# Patient Record
Sex: FEMALE | Race: WHITE | Employment: OTHER | URBAN - METROPOLITAN AREA
[De-identification: names, ages, dates, MRNs, and addresses within clinical notes are randomized per-mention and may not be internally consistent; named-entity substitution may affect disease eponyms.]

---

## 2020-10-13 ENCOUNTER — HOSPITAL ENCOUNTER (OUTPATIENT)
Age: 85
Setting detail: OBSERVATION
Discharge: HOME | End: 2020-10-14
Attending: EMERGENCY MEDICINE | Admitting: INTERNAL MEDICINE
Payer: MEDICARE

## 2020-10-13 ENCOUNTER — APPOINTMENT (OUTPATIENT)
Dept: CT IMAGING | Age: 85
End: 2020-10-13
Payer: MEDICARE

## 2020-10-13 PROBLEM — J35.8: Status: ACTIVE | Noted: 2020-10-13

## 2020-10-13 PROBLEM — J45.909 ASTHMA: Status: ACTIVE | Noted: 2020-10-13

## 2020-10-13 PROBLEM — J44.9 CHRONIC OBSTRUCTIVE PULMONARY DISEASE (HCC): Status: ACTIVE | Noted: 2020-07-15

## 2020-10-13 PROBLEM — I10 BENIGN ESSENTIAL HYPERTENSION: Status: ACTIVE | Noted: 2020-07-15

## 2020-10-13 PROBLEM — J98.8 AIRWAY COMPROMISE: Status: ACTIVE | Noted: 2020-10-13

## 2020-10-13 PROBLEM — E11.9 DIABETES MELLITUS (HCC): Status: ACTIVE | Noted: 2020-07-15

## 2020-10-13 LAB
ABSOLUTE EOS #: <0.03 K/UL (ref 0–0.44)
ABSOLUTE IMMATURE GRANULOCYTE: 0.05 K/UL (ref 0–0.3)
ABSOLUTE LYMPH #: 1.26 K/UL (ref 1.1–3.7)
ABSOLUTE MONO #: 0.33 K/UL (ref 0.1–1.2)
ANION GAP SERPL CALCULATED.3IONS-SCNC: 9 MMOL/L (ref 9–17)
BASOPHILS # BLD: 0 % (ref 0–2)
BASOPHILS ABSOLUTE: <0.03 K/UL (ref 0–0.2)
BUN BLDV-MCNC: 20 MG/DL (ref 8–23)
BUN/CREAT BLD: 18 (ref 9–20)
CALCIUM SERPL-MCNC: 9 MG/DL (ref 8.6–10.4)
CHLORIDE BLD-SCNC: 104 MMOL/L (ref 98–107)
CO2: 24 MMOL/L (ref 20–31)
CREAT SERPL-MCNC: 1.11 MG/DL (ref 0.5–0.9)
DIFFERENTIAL TYPE: ABNORMAL
EOSINOPHILS RELATIVE PERCENT: 0 % (ref 1–4)
ESTIMATED AVERAGE GLUCOSE: 123 MG/DL
GFR AFRICAN AMERICAN: 57 ML/MIN
GFR NON-AFRICAN AMERICAN: 47 ML/MIN
GFR SERPL CREATININE-BSD FRML MDRD: ABNORMAL ML/MIN/{1.73_M2}
GFR SERPL CREATININE-BSD FRML MDRD: ABNORMAL ML/MIN/{1.73_M2}
GLUCOSE BLD-MCNC: 126 MG/DL (ref 70–99)
GLUCOSE BLD-MCNC: 170 MG/DL (ref 65–105)
HBA1C MFR BLD: 5.9 % (ref 4–6)
HCT VFR BLD CALC: 35.3 % (ref 36.3–47.1)
HEMOGLOBIN: 11.1 G/DL (ref 11.9–15.1)
IMMATURE GRANULOCYTES: 1 %
LYMPHOCYTES # BLD: 11 % (ref 24–43)
MCH RBC QN AUTO: 30.2 PG (ref 25.2–33.5)
MCHC RBC AUTO-ENTMCNC: 31.4 G/DL (ref 28.4–34.8)
MCV RBC AUTO: 96.2 FL (ref 82.6–102.9)
MONOCYTES # BLD: 3 % (ref 3–12)
NRBC AUTOMATED: 0 PER 100 WBC
PDW BLD-RTO: 12.9 % (ref 11.8–14.4)
PLATELET # BLD: 189 K/UL (ref 138–453)
PLATELET ESTIMATE: ABNORMAL
PMV BLD AUTO: 10 FL (ref 8.1–13.5)
POTASSIUM SERPL-SCNC: 4.4 MMOL/L (ref 3.7–5.3)
RBC # BLD: 3.67 M/UL (ref 3.95–5.11)
RBC # BLD: ABNORMAL 10*6/UL
SEDIMENTATION RATE, ERYTHROCYTE: 37 MM (ref 0–30)
SEG NEUTROPHILS: 85 % (ref 36–65)
SEGMENTED NEUTROPHILS ABSOLUTE COUNT: 9.4 K/UL (ref 1.5–8.1)
SODIUM BLD-SCNC: 137 MMOL/L (ref 135–144)
WBC # BLD: 11.1 K/UL (ref 3.5–11.3)
WBC # BLD: ABNORMAL 10*3/UL

## 2020-10-13 PROCEDURE — 85025 COMPLETE CBC W/AUTO DIFF WBC: CPT

## 2020-10-13 PROCEDURE — 99283 EMERGENCY DEPT VISIT LOW MDM: CPT

## 2020-10-13 PROCEDURE — 6360000002 HC RX W HCPCS: Performed by: NURSE PRACTITIONER

## 2020-10-13 PROCEDURE — 80048 BASIC METABOLIC PNL TOTAL CA: CPT

## 2020-10-13 PROCEDURE — 6370000000 HC RX 637 (ALT 250 FOR IP): Performed by: NURSE PRACTITIONER

## 2020-10-13 PROCEDURE — 2580000003 HC RX 258: Performed by: NURSE PRACTITIONER

## 2020-10-13 PROCEDURE — 83036 HEMOGLOBIN GLYCOSYLATED A1C: CPT

## 2020-10-13 PROCEDURE — 70487 CT MAXILLOFACIAL W/DYE: CPT

## 2020-10-13 PROCEDURE — 94640 AIRWAY INHALATION TREATMENT: CPT

## 2020-10-13 PROCEDURE — 2580000003 HC RX 258: Performed by: EMERGENCY MEDICINE

## 2020-10-13 PROCEDURE — 6360000004 HC RX CONTRAST MEDICATION: Performed by: EMERGENCY MEDICINE

## 2020-10-13 PROCEDURE — 96366 THER/PROPH/DIAG IV INF ADDON: CPT

## 2020-10-13 PROCEDURE — G0378 HOSPITAL OBSERVATION PER HR: HCPCS

## 2020-10-13 PROCEDURE — 82947 ASSAY GLUCOSE BLOOD QUANT: CPT

## 2020-10-13 PROCEDURE — 94761 N-INVAS EAR/PLS OXIMETRY MLT: CPT

## 2020-10-13 PROCEDURE — 96365 THER/PROPH/DIAG IV INF INIT: CPT

## 2020-10-13 PROCEDURE — 96375 TX/PRO/DX INJ NEW DRUG ADDON: CPT

## 2020-10-13 PROCEDURE — 99219 PR INITIAL OBSERVATION CARE/DAY 50 MINUTES: CPT | Performed by: NURSE PRACTITIONER

## 2020-10-13 PROCEDURE — 96376 TX/PRO/DX INJ SAME DRUG ADON: CPT

## 2020-10-13 PROCEDURE — 85652 RBC SED RATE AUTOMATED: CPT

## 2020-10-13 PROCEDURE — 99285 EMERGENCY DEPT VISIT HI MDM: CPT

## 2020-10-13 RX ORDER — SODIUM CHLORIDE 0.9 % (FLUSH) 0.9 %
10 SYRINGE (ML) INJECTION EVERY 12 HOURS SCHEDULED
Status: DISCONTINUED | OUTPATIENT
Start: 2020-10-13 | End: 2020-10-14 | Stop reason: HOSPADM

## 2020-10-13 RX ORDER — LISINOPRIL 10 MG/1
10 TABLET ORAL DAILY
Status: DISCONTINUED | OUTPATIENT
Start: 2020-10-13 | End: 2020-10-13 | Stop reason: DRUGHIGH

## 2020-10-13 RX ORDER — DEXTROSE MONOHYDRATE 25 G/50ML
12.5 INJECTION, SOLUTION INTRAVENOUS PRN
Status: DISCONTINUED | OUTPATIENT
Start: 2020-10-13 | End: 2020-10-14 | Stop reason: HOSPADM

## 2020-10-13 RX ORDER — SODIUM CHLORIDE 0.9 % (FLUSH) 0.9 %
10 SYRINGE (ML) INJECTION PRN
Status: DISCONTINUED | OUTPATIENT
Start: 2020-10-13 | End: 2020-10-14 | Stop reason: HOSPADM

## 2020-10-13 RX ORDER — ACETAMINOPHEN 325 MG/1
650 TABLET ORAL EVERY 4 HOURS PRN
Status: DISCONTINUED | OUTPATIENT
Start: 2020-10-13 | End: 2020-10-14 | Stop reason: HOSPADM

## 2020-10-13 RX ORDER — DEXTROSE MONOHYDRATE 50 MG/ML
100 INJECTION, SOLUTION INTRAVENOUS PRN
Status: DISCONTINUED | OUTPATIENT
Start: 2020-10-13 | End: 2020-10-14 | Stop reason: HOSPADM

## 2020-10-13 RX ORDER — LISINOPRIL 5 MG/1
5 TABLET ORAL DAILY
Status: DISCONTINUED | OUTPATIENT
Start: 2020-10-14 | End: 2020-10-14 | Stop reason: HOSPADM

## 2020-10-13 RX ORDER — MORPHINE SULFATE 2 MG/ML
2 INJECTION, SOLUTION INTRAMUSCULAR; INTRAVENOUS EVERY 4 HOURS PRN
Status: DISCONTINUED | OUTPATIENT
Start: 2020-10-13 | End: 2020-10-14 | Stop reason: HOSPADM

## 2020-10-13 RX ORDER — BUDESONIDE AND FORMOTEROL FUMARATE DIHYDRATE 160; 4.5 UG/1; UG/1
2 AEROSOL RESPIRATORY (INHALATION) 2 TIMES DAILY
Status: DISCONTINUED | OUTPATIENT
Start: 2020-10-13 | End: 2020-10-13

## 2020-10-13 RX ORDER — DEXAMETHASONE SODIUM PHOSPHATE 10 MG/ML
10 INJECTION, SOLUTION INTRAMUSCULAR; INTRAVENOUS ONCE
Status: COMPLETED | OUTPATIENT
Start: 2020-10-13 | End: 2020-10-13

## 2020-10-13 RX ORDER — NICOTINE POLACRILEX 4 MG
15 LOZENGE BUCCAL PRN
Status: DISCONTINUED | OUTPATIENT
Start: 2020-10-13 | End: 2020-10-14 | Stop reason: HOSPADM

## 2020-10-13 RX ORDER — BUDESONIDE AND FORMOTEROL FUMARATE DIHYDRATE 160; 4.5 UG/1; UG/1
2 AEROSOL RESPIRATORY (INHALATION) 2 TIMES DAILY
Status: DISCONTINUED | OUTPATIENT
Start: 2020-10-14 | End: 2020-10-14 | Stop reason: HOSPADM

## 2020-10-13 RX ORDER — LISINOPRIL 5 MG/1
5 TABLET ORAL DAILY
COMMUNITY

## 2020-10-13 RX ORDER — POLYETHYLENE GLYCOL 3350 17 G/17G
17 POWDER, FOR SOLUTION ORAL DAILY PRN
Status: DISCONTINUED | OUTPATIENT
Start: 2020-10-13 | End: 2020-10-14 | Stop reason: HOSPADM

## 2020-10-13 RX ADMIN — Medication 10 ML: at 14:06

## 2020-10-13 RX ADMIN — BUDESONIDE AND FORMOTEROL FUMARATE DIHYDRATE 2 PUFF: 160; 4.5 AEROSOL RESPIRATORY (INHALATION) at 20:19

## 2020-10-13 RX ADMIN — SODIUM CHLORIDE 3 G: 900 INJECTION INTRAVENOUS at 21:55

## 2020-10-13 RX ADMIN — DEXAMETHASONE SODIUM PHOSPHATE 10 MG: 10 INJECTION, SOLUTION INTRAMUSCULAR; INTRAVENOUS at 15:21

## 2020-10-13 RX ADMIN — INSULIN LISPRO 1 UNITS: 100 INJECTION, SOLUTION INTRAVENOUS; SUBCUTANEOUS at 23:46

## 2020-10-13 RX ADMIN — SODIUM CHLORIDE 3 G: 900 INJECTION INTRAVENOUS at 15:21

## 2020-10-13 RX ADMIN — DEXAMETHASONE SODIUM PHOSPHATE 10 MG: 10 INJECTION, SOLUTION INTRAMUSCULAR; INTRAVENOUS at 12:45

## 2020-10-13 RX ADMIN — IOPAMIDOL 100 ML: 755 INJECTION, SOLUTION INTRAVENOUS at 14:06

## 2020-10-13 RX ADMIN — Medication 10 ML: at 22:17

## 2020-10-13 ASSESSMENT — ENCOUNTER SYMPTOMS
RHINORRHEA: 0
COUGH: 0
COLOR CHANGE: 0
DIARRHEA: 0
CONSTIPATION: 0
SINUS PRESSURE: 0
WHEEZING: 0
NAUSEA: 0
SHORTNESS OF BREATH: 0
VOMITING: 0
ABDOMINAL PAIN: 0
SORE THROAT: 0

## 2020-10-13 ASSESSMENT — PAIN SCALES - GENERAL
PAINLEVEL_OUTOF10: 0
PAINLEVEL_OUTOF10: 0
PAINLEVEL_OUTOF10: 7

## 2020-10-13 NOTE — PROGRESS NOTES
Transitions of Care Pharmacy Service   Medication Review    The patient's list of current home medications has been reviewed. Source(s) of information: patient, [facility]      PROVIDER ACTION REQUESTED  Medications that need to be addressed by a physician/nurse practitioner:    Medication Action Requested   Lisinopril 10mg daily     Home dose is 5mg daily instead -- please adjust order to match if appropriate         Please feel free to call me with any questions about this encounter. Thank you.     Duane Mata [facility]   Transitions of Care Pharmacy Service  Phone:  [phone]  Fax: [phone]      Electronically signed by Duane Mata [facility] on 10/13/2020 at 5:36 PM       Prior to Admission medications    Medication Sig       lisinopril (PRINIVIL;ZESTRIL) 5 MG tablet Take 5 mg by mouth daily        budesonide-formoterol (SYMBICORT) 160-4.5 MCG/ACT AERO Inhale 2 puffs into the lungs 2 times daily       acetaminophen (TYLENOL) 500 MG tablet Take 500 mg by mouth as needed for Pain

## 2020-10-13 NOTE — H&P
[facility]  Office: [address], DO, Darion Givens, DO, Kanu Covarrubias, DO, Debi Soares Maddie, DO, Donis Orta MD, Jai De Guzman MD, Oly Fernandez MD, Rodolfo Randolph MD, Leah Suarez MD, Eric Gonzalez MD, Letitia Mcdaniel MD, Timothy Johansen MD, Mbonu Fleet Cowden, MD, Sacha Resendiz DO, Julianne Singleton MD, Maura Lee MD, Nighat Moran DO, Nj Bess MD,  James Sanches DO, Sierra Bowser MD, Willy Torres MD, Francisco Currie, [facility], [facility], CNP, Judd Voss, CNP, Radha Cook, [facility], Yrn Vazquez, CNP, Sakina Miles, CNP, Ziggy Amaral, CNP, Johnny Soriano, CNP, Mellissa Kelley, CNP, Carmina Silva PA-C, Kymberly Uribe, [facility], Nadeen French, CNP, Artie Camargo, CNP, Genevieve Chen, CNP, Tina Sprague, CNP, Patrick Garner, [address]    HISTORY AND PHYSICAL EXAMINATION            Date:   10/13/2020  Patient name:  Max Erazo  Date of admission:  10/13/2020 11:59 AM  MRN:   [id number]  Account:  [de-identified]  YOB: 1935  PCP:    Ketty Thomas  Room:   [place] [address]  Code Status:    No Order    Chief Complaint:     Chief Complaint   Patient presents with    Pharyngitis       History Obtained From:     patient    History of Present Illness:     Max Erazo is a 82 y.o. Non-/non  female who presents with Pharyngitis   and is admitted to the hospital for the management of Obstructive tonsil. Patient presents with a less than 48-hour history of a sore throat. Patient reports that yesterday morning she woke up with sharp pain in the right posterior pharynx. Patient reports that the pain is increased dramatically by swallowing. Patient rates it a 7/10 and prevents her from eating. Patient reports that she has been unable to drink anything since yesterday morning either. Patient denies any fever or chills. Patient reports no known exposure to infected individuals. Patient rarely travels outside of her home and her primary contact is her daughter who is been healthy as well. Patient reports that she smoked cigarettes for a few years but quit nearly 60 years ago. CT was completed and reveals fluid collection suspicious for developing abscess versus neoplasm. ENT has been consulted and has requested that the patient receive only 2 doses of dexamethasone and IV Unasyn overnight and be evaluated by their services in the morning. Past Medical History:     Past Medical History:   Diagnosis Date    Asthma     Diabetes mellitus ([facility] [address]     Hypertension         Past Surgical History:     Past Surgical History:   Procedure Laterality Date    HERNIA REPAIR      TOTAL KNEE ARTHROPLASTY Bilateral 2004    TUBAL LIGATION          Medications Prior to Admission:     Prior to Admission medications    Medication Sig Start Date End Date Taking? Authorizing Provider   lisinopril (PRINIVIL;ZESTRIL) 10 MG tablet Take by mouth daily   Yes Historical Provider, MD   budesonide-formoterol (SYMBICORT) 160-4.5 MCG/ACT AERO Inhale 2 puffs into the lungs 2 times daily   Yes Historical Provider, MD   acetaminophen (TYLENOL) 500 MG tablet Take 500 mg by mouth as needed for Pain    Historical Provider, MD        Allergies:     Patient has no known allergies. Social History:     Tobacco:    reports that she is a non-smoker but has been exposed to tobacco smoke. She has never used smokeless tobacco.  Alcohol:      reports no history of alcohol use. Drug Use:  reports no history of drug use. Family History:     Family History   Problem Relation Age of Onset    Cancer Mother     Alzheimer's Disease Mother     Cancer Father     Diabetes Sister     Cancer Brother     Diabetes Sister        Review of Systems:     Positive and Negative as described in HPI. CONSTITUTIONAL:  negative for fevers, chills, sweats, fatigue, weight loss  HEENT:  negative for vision, hearing changes, runny nose. supple, no carotid bruits, thyroid not palpable  Lungs: Bilateral equal air entry, clear to auscultation, no wheezing, rales or rhonchi, normal effort  Cardiovascular: normal rate, regular rhythm, no murmur, gallop, rub.   Abdomen: Soft, nontender, nondistended, normal bowel sounds, no hepatomegaly or splenomegaly  Neurologic: There are no new focal motor or sensory deficits, normal muscle tone and bulk, no abnormal sensation, normal speech, cranial nerves II through XII grossly intact  Skin: No gross lesions, rashes, bruising or bleeding on exposed skin area  Extremities:  peripheral pulses palpable, no pedal edema or calf pain with palpation  Psych: normal affect     Investigations:      Laboratory Testing:  Recent Results (from the past 24 hour(s))   Basic Metabolic Panel    Collection Time: 10/13/20  1:00 PM   Result Value Ref Range    Glucose 126 (H) 70 - 99 mg/dL    BUN 20 8 - 23 mg/dL    CREATININE 1.11 (H) 0.50 - 0.90 mg/dL    Bun/Cre Ratio 18 9 - 20    Calcium 9.0 8.6 - 10.4 mg/dL    Sodium 137 135 - 144 mmol/L    Potassium 4.4 3.7 - 5.3 mmol/L    Chloride 104 98 - 107 mmol/L    CO2 24 20 - 31 mmol/L    Anion Gap 9 9 - 17 mmol/L    GFR Non-African American 47 (L) >60 mL/min    GFR  57 (L) >60 mL/min    GFR Comment          GFR Staging NOT REPORTED    CBC Auto Differential    Collection Time: 10/13/20  2:30 PM   Result Value Ref Range    WBC 11.1 3.5 - 11.3 k/uL    RBC 3.67 (L) 3.95 - 5.11 m/uL    Hemoglobin 11.1 (L) 11.9 - 15.1 g/dL    Hematocrit 35.3 (L) 36.3 - 47.1 %    MCV 96.2 82.6 - 102.9 fL    MCH 30.2 25.2 - 33.5 pg    MCHC 31.4 28.4 - 34.8 g/dL    RDW 12.9 11.8 - 14.4 %    Platelets 391 756 - 193 k/uL    MPV 10.0 8.1 - 13.5 fL    NRBC Automated 0.0 0.0 per 100 WBC    Differential Type NOT REPORTED     Seg Neutrophils 85 (H) 36 - 65 %    Lymphocytes 11 (L) 24 - 43 %    Monocytes 3 3 - 12 %    Eosinophils % 0 (L) 1 - 4 %    Basophils 0 0 - 2 %    Immature Granulocytes 1 (H) 0 %    Segs Absolute 9.40 (H) 1.50 - 8.10 k/uL    Absolute Lymph # 1.26 1.10 - 3.70 k/uL    Absolute Mono # 0.33 0.10 - 1.20 k/uL    Absolute Eos # <0.03 0.00 - 0.44 k/uL    Basophils Absolute <0.03 0.00 - 0.20 k/uL    Absolute Immature Granulocyte 0.05 0.00 - 0.30 k/uL    WBC Morphology NOT REPORTED     RBC Morphology NOT REPORTED     Platelet Estimate NOT REPORTED    Sedimentation Rate    Collection Time: 10/13/20  2:30 PM   Result Value Ref Range    Sed Rate 37 (H) 0 - 30 mm       Imaging/Diagnostics:  Ct Facial Bones W Contrast    Result Date: 10/13/2020  Irregular peripheral enhancing fluid collection on the right glossotonsillar sulcus. This could represent an abscess however a neoplastic process could also have this appearance. Assessment :      Hospital Problems           Last Modified POA    * (Principal) Obstructive tonsil 10/13/2020 Yes    Benign essential hypertension 10/13/2020 Yes    Diabetes mellitus ([facility] [address] 10/13/2020 Yes    Asthma 10/13/2020 Yes    Airway compromise 10/13/2020 Yes          Plan:     Patient status observation in the Med/Surge    1. Hold any further steroids unless directed by ENT  2. Agree with ENT and we will continue IV Unasyn  3. Add A1c, not currently being treated for diabetes  4. Restart home medications of lisinopril for hypertension and inhaler regiment for asthma. 5. Telemetry and pulse ox monitoring  6. Await ENT recommendations on definitive therapies.       Consultations:   IP CONSULT TO OTOLARYNGOLOGY  IP CONSULT TO INTERNAL MEDICINE      GWENDOLYN King NP  10/13/2020  4:28 PM    Copy sent to Dr. Angel Avalos

## 2020-10-13 NOTE — ED PROVIDER NOTES
EMERGENCY DEPARTMENT ENCOUNTER   ATTENDING ATTESTATION     Pt Name: Kell Baumann  MRN: [id number]  [place] 1935  Date of evaluation: 10/13/20   Kell Baumann is a 82 y.o. female with CC: Pharyngitis    MDM:   Patient is an 82-year-old female who with pain to the right side of her jaw and cheek. She states she woke up in the morning with it. She states it is painful to swallow but the pain is more left side on the right side of her jaw. Denies any fevers or chills. She has a chronic cough that is no different. No trouble actually swallowing or change in voice. No neck stiffness no headache no pain to the right or behind the right ear. No injury. On exam resting comfortably in bed afebrile well-appearing nontoxic speaking in full sentences uvula is midline no pooling of secretions no tongue elevation no intraoral abscess neck is supple no meningismus no adenopathy she has some mild tenderness over the right angle of the mandible near the TMJ no obvious enlargement of the salivary gland no erythema or warmth no fluctuance. We will check labs and CT and reassess. CRITICAL CARE:       EKG: All EKG's are interpreted by the Emergency Department Physician who either signs or Co-signs this chart in the absence of a cardiologist.      RADIOLOGY:All plain film, CT, MRI, and formal ultrasound images (except ED bedside ultrasound) are read by the radiologist, see reports below, unless otherwise noted in MDM or here. CT FACIAL BONES W CONTRAST    (Results Pending)     LABS: All lab results were reviewed by myself, and all abnormals are listed below. Labs Reviewed   CBC WITH AUTO DIFFERENTIAL   BASIC METABOLIC PANEL   CBC WITH AUTO DIFFERENTIAL     CONSULTS:  None  FINAL IMPRESSION    No diagnosis found.         PASTMEDICAL HISTORY     Past Medical History:   Diagnosis Date    Asthma     Diabetes mellitus ([facility] [address]     Hypertension      SURGICAL HISTORY       Past Surgical History:   Procedure Laterality Date    HERNIA REPAIR      TOTAL KNEE ARTHROPLASTY Bilateral 2004    TUBAL LIGATION       CURRENT MEDICATIONS       Previous Medications    ACETAMINOPHEN (TYLENOL) 500 MG TABLET    Take 500 mg by mouth as needed for Pain    BUDESONIDE-FORMOTEROL (SYMBICORT) 160-4.5 MCG/ACT AERO    Inhale 2 puffs into the lungs 2 times daily    LISINOPRIL (PRINIVIL;ZESTRIL) 10 MG TABLET    Take by mouth daily     ALLERGIES     has No Known Allergies. FAMILY HISTORY     has no family status information on file. SOCIAL HISTORY       Social History     Tobacco Use    Smoking status: Passive Smoke Exposure - Never Smoker    Smokeless tobacco: Never Used   Substance Use Topics    Alcohol use: No     Alcohol/week: 0.0 standard drinks    Drug use: No       I personally evaluated and examined the patient in conjunction with the APC and agree with the assessment, treatment plan, and disposition of the patient as recorded by the APC.    Dorian Villatoro MD  Attending Emergency Physician          Dorian Villatoro MD  10/13/20 [id number]

## 2020-10-13 NOTE — PLAN OF CARE
Problem: Safety:  Goal: Free from accidental physical injury  Description: Free from accidental physical injury  Outcome: Ongoing  Note: Patient is a fall risk during this admission. Fall risk assessment was performed. Patient is absent of falls. Bed is in the lowest position. Wheels on the bed are locked. Call light and bed side table are within reach. Clutter is removed. Patient was educated to call out when needing assistance or wanting to get out of bed. Patient offered toileting assistance during rounding. Hourly rounds have been performed. Problem: Pain:  Goal: Patient's pain/discomfort is manageable  Description: Patient's pain/discomfort is manageable  Outcome: Ongoing  Note: Pt medicated with pain medication prn. Assessed all pain characteristics including level, type, location, frequency, and onset. Non-pharmacologic interventions offered to pt as well. Pt states pain is tolerable at this time.  Will continue to monitor

## 2020-10-13 NOTE — ED PROVIDER NOTES
[address] [place] [address] ED  eMERGENCY dEPARTMENT eNCOUnter      Pt Name: Mary Grissom  MRN: [id number]  [place] 1935  Date of evaluation: 10/13/2020  Provider: Maddi Zuñiga NP, GWENDOLYN [address]       Chief Complaint   Patient presents with    Pharyngitis         HISTORY OF PRESENT ILLNESS  (Location/Symptom, Timing/Onset, Context/Setting, Quality, Duration, Modifying Factors, Severity.)   Mary Grissom is a 82 y.o. female who presents to the emergency department today by private vehicle for evaluation of cough sore throat. Patient states that she has a pain to the back of the right gumline/tongue area. She states that is been going on for last couple of days but worse today. She states that it hurt once when she swallows. She rates the pain a 7 on a 0-to-10 scale. The daughter reports that she does not complain a lot of pain,. She has not had a fever but has had some chills. Nursing Notes were reviewed. ALLERGIES     Patient has no known allergies.     CURRENT MEDICATIONS       Previous Medications    ACETAMINOPHEN (TYLENOL) 500 MG TABLET    Take 500 mg by mouth as needed for Pain    BUDESONIDE-FORMOTEROL (SYMBICORT) 160-4.5 MCG/ACT AERO    Inhale 2 puffs into the lungs 2 times daily    LISINOPRIL (PRINIVIL;ZESTRIL) 10 MG TABLET    Take by mouth daily       PAST MEDICAL HISTORY         Diagnosis Date    Asthma     Diabetes mellitus ([facility] [address]     Hypertension        SURGICAL HISTORY           Procedure Laterality Date    HERNIA REPAIR      TOTAL KNEE ARTHROPLASTY Bilateral 2004    TUBAL LIGATION           FAMILY HISTORY           Problem Relation Age of Onset    Cancer Mother     Alzheimer's Disease Mother     Cancer Father     Diabetes Sister     Cancer Brother     Diabetes Sister      Family Status   Relation Name Status    Mother  (Not Specified)    Father  (Not Specified)   Tobias Lozano Sister  (Not Specified)   Tobias Lozano Brother  (Not Specified)    Sister  (Not Specified) SOCIAL HISTORY      reports that she is a non-smoker but has been exposed to tobacco smoke. She has never used smokeless tobacco. She reports that she does not drink alcohol or use drugs. REVIEW OF SYSTEMS    (2-9 systems for level 4, 10 or more for level 5)     Review of Systems   Constitutional: Negative for chills, fever and unexpected weight change. HENT: Negative for congestion, rhinorrhea, sinus pressure and sore throat. Respiratory: Negative for cough, shortness of breath and wheezing. Cardiovascular: Negative for chest pain and palpitations. Gastrointestinal: Negative for abdominal pain, constipation, diarrhea, nausea and vomiting. Genitourinary: Negative for dysuria and hematuria. Musculoskeletal: Negative for arthralgias and myalgias. Skin: Negative for color change and rash. Neurological: Negative for dizziness, weakness and headaches. Hematological: Negative for adenopathy. All other systems reviewed and are negative. Except as noted above the remainder of the review of systems was reviewed and negative. PHYSICAL EXAM    (up to 7 for level 4, 8 or more for level 5)     ED Triage Vitals   BP Temp Temp Source Pulse Resp SpO2 Height Weight   10/13/20 1148 10/13/20 1148 10/13/20 1610 10/13/20 1148 10/13/20 1150 10/13/20 1148 10/13/20 1603 10/13/20 1603   138/70 98.8 °F (37.1 °C) Oral 93 18 100 % 5' 0.5\" (1.537 m) 160 lb (72.6 kg)       Physical Exam  Vitals signs reviewed. Constitutional:       Appearance: She is well-developed. HENT:      Head: Normocephalic. Comments: The uvula is swollen and edematous without shift     Mouth/Throat:     Eyes:      Conjunctiva/sclera: Conjunctivae normal.      Pupils: Pupils are equal, round, and reactive to light. Neck:      Musculoskeletal: Normal range of motion and neck supple. Cardiovascular:      Rate and Rhythm: Normal rate and regular rhythm.    Pulmonary:      Effort: Pulmonary effort is normal. No respiratory distress. Breath sounds: Normal breath sounds. No stridor. Abdominal:      General: Bowel sounds are normal.      Palpations: Abdomen is soft. Musculoskeletal: Normal range of motion. Lymphadenopathy:      Cervical: No cervical adenopathy. Skin:     General: Skin is warm and dry. Findings: No rash. Neurological:      Mental Status: She is alert and oriented to person, place, and time. RADIOLOGY:   Non-plain film images such as CT, Ultrasound and MRI are read by the radiologist. [facility] radiographic images are visualized and preliminarily interpreted by the emergency physician with the below findings:    Ct Facial Bones W Contrast    Result Date: 10/13/2020  EXAMINATION: CT OF THE FACE WITH CONTRAST 10/13/2020 TECHNIQUE: CT of the face was performed with the administration of intravenous contrast. Multiplanar reformatted images are provided for review. Dose modulation, iterative reconstruction, and/or weight based adjustment of the mA/kV was utilized to reduce the radiation dose to as low as reasonably achievable. COMPARISON: None. HISTORY: ORDERING SYSTEM PROVIDED HISTORY: abscess TECHNOLOGIST PROVIDED HISTORY: abscess Reason for Exam: Right sided facial swelling, intermittent since yesterday Acuity: Acute Type of Exam: Initial FINDINGS: PHARYNX/LARYNX: There is a peripheral enhancing fluid collection centered along the right glossotonsillar sulcus. There is overlying mucosal thickening and edema. This peripheral enhancing fluid collection measures 10 x 6 x 11 mm. The airway is normal in appearance. The epiglottis is normal. The epiglottic folds are normal.  The glottis is normal in appearance. SALIVARY GLANDS: The parotid glands and submandibular glands are normal in appearance. The thyroid gland is partially imaged but unremarkable. LYMPH NODES: There is no pathologically enlarged lymphadenopathy identified. SOFT TISSUES: There is no subcutaneous soft tissue stranding. BRAIN/ORBITS/SINUSES: Limited evaluation of the brain and orbits is unremarkable. The paranasal sinuses are clear. BONES:  No lytic or blastic osseous lesions are identified. Irregular peripheral enhancing fluid collection on the right glossotonsillar sulcus. This could represent an abscess however a neoplastic process could also have this appearance. Interpretation per the Radiologist below, if available at the time of this note:    CT FACIAL BONES W CONTRAST   Preliminary Result   Irregular peripheral enhancing fluid collection on the right glossotonsillar   sulcus. This could represent an abscess however a neoplastic process could   also have this appearance. LABS:  Labs Reviewed   BASIC METABOLIC PANEL - Abnormal; Notable for the following components:       Result Value    Glucose 126 (*)     CREATININE 1.11 (*)     GFR Non- 47 (*)     GFR  57 (*)     All other components within normal limits   CBC WITH AUTO DIFFERENTIAL - Abnormal; Notable for the following components:    RBC 3.67 (*)     Hemoglobin 11.1 (*)     Hematocrit 35.3 (*)     Seg Neutrophils 85 (*)     Lymphocytes 11 (*)     Eosinophils % 0 (*)     Immature Granulocytes 1 (*)     Segs Absolute 9.40 (*)     All other components within normal limits   SEDIMENTATION RATE - Abnormal; Notable for the following components:    Sed Rate 37 (*)     All other components within normal limits   HEMOGLOBIN A1C       All other labs were within normal range or not returned as of this dictation. EMERGENCY DEPARTMENT COURSE and DIFFERENTIAL DIAGNOSIS/MDM:   Vitals:    Vitals:    10/13/20 1148 10/13/20 1150 10/13/20 1603 10/13/20 1610   BP: 138/70   136/66   Pulse: 93   76   Resp:  18  18   Temp: 98.8 °F (37.1 °C)   98.2 °F (36.8 °C)   TempSrc:    Oral   SpO2: 100%   99%   Weight:   160 lb (72.6 kg)    Height:   5' 0.5\" (1.537 m)        Medical Decision Making: Woke with Dr. Kiara Casanova.   He looked at the CT scan himself. There is concerned that this could be either infectious or neoplastic. He recommends that we give the patient an additional dose of 10 mg of Decadron to make it 20 mg total in addition to IV Unasyn. He recommends admission to the hospital and he will see the patient tomorrow for further evaluation and work-up. Case was discussed with the patient and daughter who agree with admission. Case was discussed with internal medicine. Medications   sodium chloride flush 0.9 % injection 10 mL (10 mLs Intravenous Given 10/13/20 1406)   dexamethasone (PF) (DECADRON) injection 10 mg (10 mg Intravenous Given 10/13/20 1245)   iopamidol (ISOVUE-370) 76 % injection 100 mL (100 mLs Intravenous Given 10/13/20 1406)   dexamethasone (PF) (DECADRON) injection 10 mg (10 mg Intravenous Given 10/13/20 1521)   ampicillin-sulbactam (UNASYN) 3 g ivpb minibag (0 g Intravenous Stopped 10/13/20 1611)       CONSULTS:  IP CONSULT TO OTOLARYNGOLOGY  IP CONSULT TO INTERNAL MEDICINE      FINAL IMPRESSION      1. Acute anterior uveitis    2. Abnormal CT scan          DISPOSITION/PLAN   DISPOSITION Admitted 10/13/2020 03:45:50 PM      PATIENT REFERRED TO:   No follow-up provider specified.     DISCHARGE MEDICATIONS:     New Prescriptions    No medications on file           (Please note that portions of this note were completed with a voice recognition program.  Efforts were made to edit the dictations but occasionally words are mis-transcribed.)    [address] [facility] FRANCISCO, GWENDOLYN - CNP  Certified Nurse Practitioner          GWENDOLYN Rodriguez CNP  10/13/20 4210

## 2020-10-14 VITALS
BODY MASS INDEX: 30.21 KG/M2 | HEART RATE: 71 BPM | OXYGEN SATURATION: 98 % | RESPIRATION RATE: 16 BRPM | SYSTOLIC BLOOD PRESSURE: 123 MMHG | HEIGHT: 61 IN | DIASTOLIC BLOOD PRESSURE: 54 MMHG | TEMPERATURE: 97.9 F | WEIGHT: 160 LBS

## 2020-10-14 PROBLEM — K14.0 ABSCESS OF TONGUE: Status: ACTIVE | Noted: 2020-10-14

## 2020-10-14 LAB
ANION GAP SERPL CALCULATED.3IONS-SCNC: 10 MMOL/L (ref 9–17)
BUN BLDV-MCNC: 26 MG/DL (ref 8–23)
BUN/CREAT BLD: 21 (ref 9–20)
CALCIUM SERPL-MCNC: 9 MG/DL (ref 8.6–10.4)
CHLORIDE BLD-SCNC: 107 MMOL/L (ref 98–107)
CO2: 22 MMOL/L (ref 20–31)
CREAT SERPL-MCNC: 1.24 MG/DL (ref 0.5–0.9)
GFR AFRICAN AMERICAN: 50 ML/MIN
GFR NON-AFRICAN AMERICAN: 41 ML/MIN
GFR SERPL CREATININE-BSD FRML MDRD: ABNORMAL ML/MIN/{1.73_M2}
GFR SERPL CREATININE-BSD FRML MDRD: ABNORMAL ML/MIN/{1.73_M2}
GLUCOSE BLD-MCNC: 102 MG/DL (ref 65–105)
GLUCOSE BLD-MCNC: 116 MG/DL (ref 65–105)
GLUCOSE BLD-MCNC: 122 MG/DL (ref 70–99)
GLUCOSE BLD-MCNC: 156 MG/DL (ref 65–105)
HCT VFR BLD CALC: 35.5 % (ref 36.3–47.1)
HEMOGLOBIN: 11.2 G/DL (ref 11.9–15.1)
MCH RBC QN AUTO: 30.3 PG (ref 25.2–33.5)
MCHC RBC AUTO-ENTMCNC: 31.5 G/DL (ref 28.4–34.8)
MCV RBC AUTO: 95.9 FL (ref 82.6–102.9)
NRBC AUTOMATED: 0 PER 100 WBC
PDW BLD-RTO: 12.9 % (ref 11.8–14.4)
PLATELET # BLD: 211 K/UL (ref 138–453)
PMV BLD AUTO: 10.8 FL (ref 8.1–13.5)
POTASSIUM SERPL-SCNC: 4.4 MMOL/L (ref 3.7–5.3)
RBC # BLD: 3.7 M/UL (ref 3.95–5.11)
SODIUM BLD-SCNC: 139 MMOL/L (ref 135–144)
WBC # BLD: 8.6 K/UL (ref 3.5–11.3)

## 2020-10-14 PROCEDURE — 80048 BASIC METABOLIC PNL TOTAL CA: CPT

## 2020-10-14 PROCEDURE — 2580000003 HC RX 258: Performed by: NURSE PRACTITIONER

## 2020-10-14 PROCEDURE — 99225 PR SBSQ OBSERVATION CARE/DAY 25 MINUTES: CPT | Performed by: INTERNAL MEDICINE

## 2020-10-14 PROCEDURE — 82947 ASSAY GLUCOSE BLOOD QUANT: CPT

## 2020-10-14 PROCEDURE — 96372 THER/PROPH/DIAG INJ SC/IM: CPT

## 2020-10-14 PROCEDURE — 36415 COLL VENOUS BLD VENIPUNCTURE: CPT

## 2020-10-14 PROCEDURE — 96366 THER/PROPH/DIAG IV INF ADDON: CPT

## 2020-10-14 PROCEDURE — G0378 HOSPITAL OBSERVATION PER HR: HCPCS

## 2020-10-14 PROCEDURE — 6360000002 HC RX W HCPCS: Performed by: NURSE PRACTITIONER

## 2020-10-14 PROCEDURE — 6370000000 HC RX 637 (ALT 250 FOR IP): Performed by: NURSE PRACTITIONER

## 2020-10-14 PROCEDURE — 85027 COMPLETE CBC AUTOMATED: CPT

## 2020-10-14 PROCEDURE — APPSS45 APP SPLIT SHARED TIME 31-45 MINUTES: Performed by: NURSE PRACTITIONER

## 2020-10-14 RX ORDER — AMOXICILLIN AND CLAVULANATE POTASSIUM 875; 125 MG/1; MG/1
1 TABLET, FILM COATED ORAL 2 TIMES DAILY
Qty: 20 TABLET | Refills: 0 | Status: SHIPPED | OUTPATIENT
Start: 2020-10-14 | End: 2020-10-24

## 2020-10-14 RX ADMIN — LISINOPRIL 5 MG: 5 TABLET ORAL at 09:14

## 2020-10-14 RX ADMIN — SODIUM CHLORIDE 3 G: 900 INJECTION INTRAVENOUS at 03:50

## 2020-10-14 RX ADMIN — SODIUM CHLORIDE 3 G: 900 INJECTION INTRAVENOUS at 14:00

## 2020-10-14 RX ADMIN — INSULIN LISPRO 1 UNITS: 100 INJECTION, SOLUTION INTRAVENOUS; SUBCUTANEOUS at 04:22

## 2020-10-14 RX ADMIN — SODIUM CHLORIDE 3 G: 900 INJECTION INTRAVENOUS at 09:13

## 2020-10-14 RX ADMIN — BUDESONIDE AND FORMOTEROL FUMARATE DIHYDRATE 2 PUFF: 160; 4.5 AEROSOL RESPIRATORY (INHALATION) at 09:15

## 2020-10-14 RX ADMIN — ENOXAPARIN SODIUM 40 MG: 40 INJECTION SUBCUTANEOUS at 09:13

## 2020-10-14 ASSESSMENT — PAIN SCALES - GENERAL
PAINLEVEL_OUTOF10: 2
PAINLEVEL_OUTOF10: 2

## 2020-10-14 NOTE — PLAN OF CARE
Problem: Pain:  Goal: Patient's pain/discomfort is manageable  Description: Patient's pain/discomfort is manageable  10/14/2020 1228 by Claude Pilot, RN  Outcome: Ongoing  Note: Pt medicated with pain medication prn. Assessed all pain characteristics including level, type, location, frequency, and onset. Non-pharmacologic interventions offered to pt as well. Pt states pain is tolerable at this time. Will continue to monitor      Problem: Discharge Planning:  Goal: Patients continuum of care needs are met  Description: Patients continuum of care needs are met  10/14/2020 1228 by Claude Pilot, RN  Outcome: Ongoing  Note: Discussed discharge instructions with patient. Told patient to call ENT specialist within 1 week to report symptoms. PO abx will be given upon discharge. Will continue to monitor.

## 2020-10-14 NOTE — DISCHARGE SUMMARY
steroids and an ENT consultation.     10/14 -condition improves overnight. Patient reports inability to swallow with very minimal discomfort. ENT consultation still pending. 10/14 - ENT agrees with out pt follow up. Discharge today. Significant therapeutic interventions: Steroids, antibiotics    Significant Diagnostic Studies:   Labs / Micro:  CBC:   Lab Results   Component Value Date    WBC 8.6 10/14/2020    RBC 3.70 10/14/2020    HGB 11.2 10/14/2020    HCT 35.5 10/14/2020    MCV 95.9 10/14/2020    MCH 30.3 10/14/2020    MCHC 31.5 10/14/2020    RDW 12.9 10/14/2020     10/14/2020     BMP:    Lab Results   Component Value Date    GLUCOSE 122 10/14/2020     10/14/2020    K 4.4 10/14/2020     10/14/2020    CO2 22 10/14/2020    ANIONGAP 10 10/14/2020    BUN 26 10/14/2020    CREATININE 1.24 10/14/2020    BUNCRER 21 10/14/2020    CALCIUM 9.0 10/14/2020    LABGLOM 41 10/14/2020    GFRAA 50 10/14/2020    GFR      10/14/2020    GFR NOT REPORTED 10/14/2020     U/A:  No results found for: Juan Medina, ANSELMO, Ning Richards, UROBILINOGEN, NITRU, [facility]     Radiology:  Ct Facial Bones W Contrast    Result Date: 10/13/2020  Irregular peripheral enhancing fluid collection on the right glossotonsillar sulcus. This could represent an abscess however a neoplastic process could also have this appearance. Consultations:    Consults:     Final Specialist Recommendations/Findings:   IP CONSULT TO OTOLARYNGOLOGY  IP CONSULT TO INTERNAL MEDICINE      The patient was seen and examined on day of discharge and this discharge summary is in conjunction with any daily progress note from day of discharge.     Discharge plan:     Disposition: Home    Physician Follow Up:     Natalia Pelletier MD  [address] [place] [address]  [phone]    Call in 1 week         Requiring Further Evaluation/Follow Up POST HOSPITALIZATION/Incidental Findings: ENT eval    Diet: regular diet    Activity: As tolerated    Instructions to Patient: Take the antibiotics as ordered. Follow up with ENT in one-two weeks. Discharge Medications:      Medication List      START taking these medications    amoxicillin-clavulanate 875-125 MG per tablet  Commonly known as:  AUGMENTIN  Take 1 tablet by mouth 2 times daily for 10 days        CONTINUE taking these medications    acetaminophen 500 MG tablet  Commonly known as:  TYLENOL     lisinopril 5 MG tablet  Commonly known as:  PRINIVIL;ZESTRIL     Symbicort 160-4.5 MCG/ACT Aero  Generic drug:  budesonide-formoterol           Where to Get Your Medications      These medications were sent to [facility], [address] [place] [address], [address] [place], [place] [id number]    Phone:  [phone]   · amoxicillin-clavulanate 875-125 MG per tablet         No discharge procedures on file. Time Spent on discharge is  20 mins in patient examination, evaluation, counseling as well as medication reconciliation, prescriptions for required medications, discharge plan and follow up. Electronically signed by   GWENDOLYN Cervantes NP  10/14/2020  12:00 PM      Thank you Dr. Lindy Medellin MD for the opportunity to be involved in this patient's care.

## 2020-10-14 NOTE — CONSULTS
Department   of   Otolaryngology    Assessment:   Right Tongue Abscess   Odynophagia - resolved    Plan:  -there are no signs of neoplasm on exam and I believe this was just an isolated infection that caused a tongue base abscess. Laryngoscopy revealed a widely patent airway with no erythema, neoplasm or abscess  -there is no need for surgical intervention or drainage  -OK to start diet  -OK to d/c home after her next dose of Unasyn  -she can go home on Augmentin 875 po bid for 10 days  -f/u with me prn as outpatient      CHIEF   COMPLAINT:      Sore throat    CONSULTING PHYSICIAN:  Dr. Barton Bodily:                            Yisel Hussein      is   a   82 y.o.   female   with   significant   past   medical   history   of  Asthma, HTN and DM who presents with a two day history of sore throat favoring the right side. Patient reports that yesterday morning she woke up with sharp pain in the right posterior pharynx. Patient reports that the pain is increased dramatically by swallowing. Patient rates it a 7/10 and prevents her from eating. Patient reports that she has been unable to drink anything since yesterday morning either. Patient denies any fever or chills. Patient reports no known exposure to infected individuals. Patient rarely travels outside of her home and her primary contact is her daughter who is been healthy as well. Patient reports that she smoked cigarettes for a few years but quit nearly 60 years ago. CT was completed and reveals fluid collection suspicious for developing abscess versus neoplasm. She admits to traveling for a wedding 10 days ago. She has been on Unasyn overnight with almost complete resolution of her symptoms.     Past   Medical   History:       Diagnosis Date    Asthma     Diabetes mellitus ([address]     Hypertension           Surgical   History:       Procedure Laterality Date    HERNIA REPAIR      TOTAL KNEE ARTHROPLASTY Bilateral 2004  TUBAL LIGATION            Medications: Current Facility-Administered Medications: sodium chloride flush 0.9 % injection 10 mL, 10 mL, Intravenous, PRN  sodium chloride flush 0.9 % injection 10 mL, 10 mL, Intravenous, 2 times per day  sodium chloride flush 0.9 % injection 10 mL, 10 mL, Intravenous, PRN  polyethylene glycol (GLYCOLAX) packet 17 g, 17 g, Oral, Daily PRN  enoxaparin (LOVENOX) injection 40 mg, 40 mg, Subcutaneous, Daily  ampicillin-sulbactam (UNASYN) 3 g in sodium chloride 0.9 % 100 mL IVPB, 3 g, Intravenous, Q6H  acetaminophen (TYLENOL) tablet 650 mg, 650 mg, Oral, Q4H PRN  morphine (PF) injection 2 mg, 2 mg, Intravenous, Q4H PRN  insulin lispro (HUMALOG) injection vial 0-6 Units, 0-6 Units, Subcutaneous, Q4H  glucose (GLUTOSE) 40 % oral gel 15 g, 15 g, Oral, PRN  dextrose 50 % IV solution, 12.5 g, Intravenous, PRN  glucagon (rDNA) injection 1 mg, 1 mg, Intramuscular, PRN  dextrose 5 % solution, 100 mL/hr, Intravenous, PRN  budesonide-formoterol (SYMBICORT) 160-4.5 MCG/ACT inhaler 2 puff, 2 puff, Inhalation, BID  lisinopril (PRINIVIL;ZESTRIL) tablet 5 mg, 5 mg, Oral, Daily     Allergies:      Patient has no known allergies. Social History:    Social History     Socioeconomic History    Marital status:       Spouse name: Not on file    Number of children: Not on file    Years of education: Not on file    Highest education level: Not on file   Occupational History    Not on file   Social Needs    Financial resource strain: Not on file    Food insecurity     Worry: Not on file     Inability: Not on file    Transportation needs     Medical: Not on file     Non-medical: Not on file   Tobacco Use    Smoking status: Passive Smoke Exposure - Never Smoker    Smokeless tobacco: Never Used   Substance and Sexual Activity    Alcohol use: No     Alcohol/week: 0.0 standard drinks    Drug use: No    Sexual activity: Not on file   Lifestyle    Physical activity     Days per week: Not on file Minutes per session: Not on file    Stress: Not on file   Relationships    Social connections     Talks on phone: Not on file     Gets together: Not on file     Attends Hinduism service: Not on file     Active member of club or organization: Not on file     Attends meetings of clubs or organizations: Not on file     Relationship status: Not on file    Intimate partner violence     Fear of current or ex partner: Not on file     Emotionally abused: Not on file     Physically abused: Not on file     Forced sexual activity: Not on file   Other Topics Concern    Not on file   Social History Narrative    Not on file       Family   History:       Problem Relation Age of Onset    Cancer Mother     Alzheimer's Disease Mother     Cancer Father     Diabetes Sister     Cancer Brother     Diabetes Sister        REVIEW   OF   SYSTEMS:      As   above   and:   CONSTITUTIONAL:  negative for fevers, chills, sweats, fatigue, weight loss  HEENT:  negative for vision, hearing changes, runny nose, +throat pain  RESPIRATORY:  negative for shortness of breath, cough, congestion, wheezing  CARDIOVASCULAR:  negative for chest pain, palpitations  GASTROINTESTINAL:  negative for nausea, vomiting, diarrhea, constipation, change in bowel habits, abdominal pain   GENITOURINARY:  negative for difficulty of urination, burning with urination, frequency   INTEGUMENT:  negative for rash, skin lesions, easy bruising   HEMATOLOGIC/LYMPHATIC:  negative for swelling/edema   ALLERGIC/IMMUNOLOGIC:  negative for urticaria , itching  ENDOCRINE:  negative increase in drinking, increase in urination, hot or cold intolerance  MUSCULOSKELETAL:  negative joint pains, muscle aches, swelling of joints  NEUROLOGICAL:  negative for headaches, dizziness, lightheadedness, numbness, pain, tingling extremities  BEHAVIOR/PSYCH:  negative for depression, anxiety    DIAGNOSTIC STUDIES REVIEWED:  Labs   and   Radiology   reports/films   Reviewed  CT FINDINGS:    PHARYNX/LARYNX: There is a peripheral enhancing fluid collection centered    along the right glossotonsillar sulcus.  There is overlying mucosal    thickening and edema.  This peripheral enhancing fluid collection measures 10    x 6 x 11 mm.  The airway is normal in appearance.  The epiglottis is normal.    The epiglottic folds are normal.  The glottis is normal in appearance.         SALIVARY GLANDS: The parotid glands and submandibular glands are normal in    appearance.  The thyroid gland is partially imaged but unremarkable.         LYMPH NODES: There is no pathologically enlarged lymphadenopathy identified.         SOFT TISSUES: There is no subcutaneous soft tissue stranding.         BRAIN/ORBITS/SINUSES: Limited evaluation of the brain and orbits is    unremarkable.  The paranasal sinuses are clear.         BONES:  No lytic or blastic osseous lesions are identified.              Impression    Irregular peripheral enhancing fluid collection on the right glossotonsillar    sulcus.  This could represent an abscess however a neoplastic process could    also have this appearance.                EXAM:   VITALS:      BP (!) 123/54   Pulse 71   Temp 97.9 °F (36.6 °C) (Oral)   Resp 16   Ht 5' 0.5\" (1.537 m)   Wt 160 lb (72.6 kg)   SpO2 98%   BMI 30.73 kg/m²     CONSTITUTIONAL:      awake,   alert,   cooperative,   no   apparent   distress,   and   appears   stated   age   No labored breathing, no stridor, normal voice  EYES:      Lids   and   lashes   normal,   pupils   equal,   round   and   reactive   to   light,   extra   ocular   muscles intact,   sclera   clear,   conjunctiva   Normal  HEAD:      Normocephalic,   without   obvious   abnormality,   atraumatic,   sinuses   nontender   on   palpation,   EARS:external   ears   without   lesions,   Minimal wax noted  NOSE: patent nasal airway, no bleeding noted, no lesions, left DNS  ORAL: oral cavity &  pharynx   with   moist   mucus   membranes, tonsils   without erythema   or   exudates,   no fluctuance, uvula midline, gums   normal   and   good   Dentition. The floor of mouth is soft without induration, the tongue base is soft as well. NECK:      Supple,   symmetrical,   trachea   midline,  Mild right cervical  adenopathy,   thyroid   symmetric,   not   enlarged and   no   tenderness,   skin   Normal  CHEST: equal expansion and symmetric, lungs CTA  CV: Heart RRR  MUSCULOSKELETAL:      There   is   no   redness,   warmth,   or   swelling   of   the   joints. Full   range   of motion   noted. Motor   strength   is   5   out   of   5   all   extremities   bilaterally. Tone   is   normal.   NEUROLOGIC:      Awake,   alert,   oriented   to   name,   place   and   time. Cranial   nerves   II-XII   are grossly   intact. Motor   is   5   out   of   5   bilaterally. Sensory   is   Intact. PSYCH: Normal Mood and affect  SKIN: Normal turgor, no rash    PROCEDURE NOTE:    Pre-Op Diagnosis: Odynophagia    Post-op Diagnosis: same    Procedure : Flexible fiberoptic Laryngoscopy    Indications: based on the diagnoses above, it was recommended that this patient undergo a flexible laryngoscopy. Risks, benefits and alternatives discussed, and verbal consent obtained. Procedure: A flexible laryngoscope was inserted into the more patent nostril and passed through the nasal passage and nasopharynx to visualize the oropharynx, hypopharynx, and larynx in detail. The structures examined included the vocal cords, epiglottis, tongue base, hypopharynx and larynx. The airway appeared to be patent and the vocal cords appeared to be mobile. No lesions were seen. The right tongue base looks normal with no visible abscess or neoplasm. No edema noted. Patient tolerated procedure well without any complications.     Electronically   signed   by   Shanika Aguilar MD   on   10/14/2020   at   11:08 AM

## 2020-10-14 NOTE — CARE COORDINATION
Case Management Initial Discharge Plan  Trang Oro,             Met with:patient or family member patient and daughter to discuss discharge plans. Information verified: address, contacts, phone number, , insurance Yes  PCP: Dr. Edward Chamberlain  Date of last visit: 3 months ago    Insurance Provider: Medicare    Discharge Planning    Living Arrangements:  Children (daughter)  Support Systems:  Children, Family Members    Home has 1 story mobile home  5 stairs to climb to get into front door, 0 stairs to climb to reach second floor  Location of bedroom/bathroom in home  - main floor    Patient able to perform ADL's:Independent    Current Services (outpatient & in home) none  DME equipment: shower chair, walker, crutches (only uses shower chair)  DME provider: N/A    Pharmacy: [facility] [address]    Potential Assistance Needed:  N/A    Patient agreeable to home care: No  Jal of choice provided:  n/a    Prior SNF/Rehab Placement and Facility: No  Agreeable to SNF/Rehab: No  Jal of choice provided: n/a   Evaluation: n/a    Expected Discharge date:  10/15/20  Patient expects to be discharged to:  home  Follow Up Appointment: Best Day/ Time: Tuesday PM    Transportation provider: daughter  Transportation arrangements needed for discharge: No    Readmission Risk              Risk of Unplanned Readmission:        0             Does patient have a readmission risk score greater than 14?: No  If yes, follow-up appointment must be made within 7 days of discharge. Goal of Care:       Discharge Plan: Met with patient at bedside. Lives with daughter and is independent. Admitted for pain in throat and difficulty swallowing that started yesterday AM.  Currently on IV Unasyn. ENT consulted and Dr. Santana Gonzalez rounded and  laryngoscopy done at bedside. Can D/C home on PO ATB for 10 days. Pt has appointment with Dr. Edward Chamberlain 10-21-20 at 8:30am.  No home needs identified.                Electronically signed by Mart Nagy Emiliano Brannon RN on 10/14/20 at 11:44 AM EDT

## 2020-10-14 NOTE — PROGRESS NOTES
[facility]  Office: [address], DO, Jackson Ybarra, DO, Philippe Correa, DO, Jorge Dillon Blood, DO, Lanette Fontaine MD, Michael Antony MD, Jesus Chi MD, Kvng Emery MD, Trey Rachel MD, Anastacio Dillon MD, Richa Garcia MD, Irina French MD, Amelia Cortez MD, Katy Isaac, DO, Ashkan Burrell MD, Yanna Mirza MD, Nely Grewal DO, Parviz Jaquez MD,  Everton Heredia, DO, Lan Garcia MD, Donato Addison MD, Malcolm Clarke, [facility], [facility], CNP, Yanique Bowser, CNP, Chapis Addison, CNS, Juan David Garrett, CNP, Latesha Whitfield, CNP, Terry Guillen, CNP, Donnell Austin, CNP, Michele Archer, CNP, Catheryn Leventhal, PA-C, Nupur Zuleta, [facility], Lola Hammer, CNP, Shannan Zuluaga, CNP, Jabier Jacinto, CNP, Salvador Malin, CNP, Selene Randolph, CNP         [facility]    Progress Note    10/14/2020    10:03 AM    Name:   Sofia Sage  MRN:     [id number]     Acct:      [de-identified]   Room:   2022/2022-01   Day:  0  Admit Date:  10/13/2020 11:59 AM    PCP:   Adrián Moise  Code Status:  Full Code    Subjective:     C/C:   Chief Complaint   Patient presents with    Pharyngitis     Interval History Status: significantly improved. Significant improvement in condition overnight. Patient reports far less pressure and pain in her throat. Patient reports that she can now swallow ice water with only very minimal discomfort. Diet will be advanced. ENT consultation still pending. No indications for systemic infectious process at this time. We anticipate discharge with outpatient follow-up with ENT for further evaluation however we will leave this final decision to ENT. Brief History:     10/13 -admitted with sore throat and difficulty in swallowing. Emergency department evaluation reveals fluid collection in the oropharynx possible abscess versus inflammatory change of undetermined cause.   Admitted for IV antibiotics and steroids and an ENT consultation. 10/14 -condition improves overnight. Patient reports inability to swallow with very minimal discomfort. ENT consultation still pending. Review of Systems:     Constitutional:  negative for chills, fevers, sweats  Respiratory:  negative for cough, dyspnea on exertion, shortness of breath, wheezing  Cardiovascular:  negative for chest pain, chest pressure/discomfort, lower extremity edema, palpitations  Gastrointestinal:  negative for abdominal pain, constipation, diarrhea, nausea, vomiting  Neurological:  negative for dizziness, headache    Medications: Allergies:  No Known Allergies    Current Meds:   Scheduled Meds:    sodium chloride flush  10 mL Intravenous 2 times per day    enoxaparin  40 mg Subcutaneous Daily    ampicillin-sulbactam (UNASYN) IVPB 3 g  3 g Intravenous Q6H    insulin lispro  0-6 Units Subcutaneous Q4H    budesonide-formoterol  2 puff Inhalation BID    lisinopril  5 mg Oral Daily     Continuous Infusions:    dextrose       PRN Meds: sodium chloride flush, sodium chloride flush, polyethylene glycol, acetaminophen, morphine, glucose, dextrose, glucagon (rDNA), dextrose    Data:     Past Medical History:   has a past medical history of Asthma, Diabetes mellitus ([address] and Hypertension. Social History:   reports that she is a non-smoker but has been exposed to tobacco smoke. She has never used smokeless tobacco. She reports that she does not drink alcohol or use drugs.      Family History:   Family History   Problem Relation Age of Onset    Cancer Mother     Alzheimer's Disease Mother     Cancer Father     Diabetes Sister     Cancer Brother     Diabetes Sister        Vitals:  BP (!) 123/54   Pulse 71   Temp 97.9 °F (36.6 °C) (Oral)   Resp 16   Ht 5' 0.5\" (1.537 m)   Wt 160 lb (72.6 kg)   SpO2 98%   BMI 30.73 kg/m²   Temp (24hrs), Av.1 °F (36.7 °C), Min:97.3 °F (36.3 °C), Max:98.8 °F (37.1 °C)    Recent Labs     10/13/20  2059 10/14/20  0416 10/14/20  0648   POCGLU 170* 156* 116*       I/O (24Hr): No intake or output data in the 24 hours ending 10/14/20 1003    Labs:  Hematology:  Recent Labs     10/13/20  1430 10/14/20  0627   WBC 11.1 8.6   RBC 3.67* 3.70*   HGB 11.1* 11.2*   HCT 35.3* 35.5*   MCV 96.2 95.9   MCH 30.2 30.3   MCHC 31.4 31.5   RDW 12.9 12.9    211   MPV 10.0 10.8   SEDRATE 37*  --      Chemistry:  Recent Labs     10/13/20  1300 10/14/20  0627    139   K 4.4 4.4    107   CO2 24 22   GLUCOSE 126* 122*   BUN 20 26*   CREATININE 1.11* 1.24*   ANIONGAP 9 10   LABGLOM 47* 41*   GFRAA 57* 50*   CALCIUM 9.0 9.0     Recent Labs     10/13/20  1430 10/13/20  2223 10/14/20  0416 10/14/20  0648   LABA1C 5.9  --   --   --    POCGLU  --  170* 156* 116*     ABG:No results found for: POCPH, PHART, PH, POCPCO2, JIW3QAQ, PCO2, POCPO2, PO2ART, PO2, POCHCO3, LQR8DKA, HCO3, NBEA, PBEA, BEART, BE, THGBART, THB, PPA8UTF, JJNJ6BPG, S3VSCAWE, O2SAT, FIO2  No results found for: SPECIAL  No results found for: CULTURE    Radiology:  Ct Facial Bones W Contrast    Result Date: 10/13/2020  Irregular peripheral enhancing fluid collection on the right glossotonsillar sulcus. This could represent an abscess however a neoplastic process could also have this appearance.        Physical Examination:        General appearance:  alert, cooperative and no distress  Mental Status:  oriented to person, place and time and normal affect  Lungs:  clear to auscultation bilaterally, normal effort  Heart:  regular rate and rhythm, no murmur  Abdomen:  soft, nontender, nondistended, normal bowel sounds, no masses, hepatomegaly, splenomegaly  Extremities:  no edema, redness, tenderness in the calves  Skin:  no gross lesions, rashes, induration    Assessment:        Hospital Problems           Last Modified POA    * (Principal) Obstructive tonsil 10/13/2020 Yes    Benign essential hypertension 10/13/2020 Yes    Diabetes mellitus ([facility] [address] 10/13/2020 Yes Asthma 10/13/2020 Yes    Airway compromise 10/13/2020 Yes          Plan:        1. Continue IV Unasyn  2. Recommend outpatient follow-up with PCP to review A1c results, currently still pending  3. Continue home medications  4. Defer to ENT on further treatment and diagnostics, anticipate outpatient.     GWENDOLYN Lackey NP  10/14/2020  10:03 AM

## 2021-05-15 ENCOUNTER — HOSPITAL ENCOUNTER (OUTPATIENT)
Age: 86
Discharge: HOME | End: 2021-05-17
Payer: MEDICARE

## 2021-05-15 ENCOUNTER — HOSPITAL ENCOUNTER (OUTPATIENT)
Age: 86
Discharge: HOME | End: 2021-05-15
Payer: MEDICARE

## 2021-05-15 ENCOUNTER — HOSPITAL ENCOUNTER (OUTPATIENT)
Dept: GENERAL RADIOLOGY | Age: 86
Discharge: HOME | End: 2021-05-17
Payer: MEDICARE

## 2021-05-15 DIAGNOSIS — M85.40 BONE CYST, SOLITARY: ICD-10-CM

## 2021-05-15 LAB
ABSOLUTE EOS #: 0.15 K/UL (ref 0–0.44)
ABSOLUTE IMMATURE GRANULOCYTE: <0.03 K/UL (ref 0–0.3)
ABSOLUTE LYMPH #: 2.86 K/UL (ref 1.1–3.7)
ABSOLUTE MONO #: 0.53 K/UL (ref 0.1–1.2)
ALBUMIN SERPL-MCNC: 3.9 G/DL (ref 3.5–5.2)
ALBUMIN/GLOBULIN RATIO: 1.1 (ref 1–2.5)
ALP BLD-CCNC: 81 U/L (ref 35–104)
ALT SERPL-CCNC: 17 U/L (ref 5–33)
ANION GAP SERPL CALCULATED.3IONS-SCNC: 14 MMOL/L (ref 9–17)
AST SERPL-CCNC: 17 U/L
BASOPHILS # BLD: 0 % (ref 0–2)
BASOPHILS ABSOLUTE: <0.03 K/UL (ref 0–0.2)
BILIRUB SERPL-MCNC: 0.36 MG/DL (ref 0.3–1.2)
BUN BLDV-MCNC: 25 MG/DL (ref 8–23)
BUN/CREAT BLD: ABNORMAL (ref 9–20)
CALCIUM SERPL-MCNC: 9 MG/DL (ref 8.6–10.4)
CHLORIDE BLD-SCNC: 106 MMOL/L (ref 98–107)
CHOLESTEROL, FASTING: 165 MG/DL
CHOLESTEROL/HDL RATIO: 2.1
CO2: 21 MMOL/L (ref 20–31)
CREAT SERPL-MCNC: 1.28 MG/DL (ref 0.5–0.9)
CREATININE URINE: 97 MG/DL (ref 28–217)
DIFFERENTIAL TYPE: NORMAL
EOSINOPHILS RELATIVE PERCENT: 2 % (ref 1–4)
GFR AFRICAN AMERICAN: 48 ML/MIN
GFR NON-AFRICAN AMERICAN: 40 ML/MIN
GFR SERPL CREATININE-BSD FRML MDRD: ABNORMAL ML/MIN/{1.73_M2}
GFR SERPL CREATININE-BSD FRML MDRD: ABNORMAL ML/MIN/{1.73_M2}
GLUCOSE FASTING: 102 MG/DL (ref 70–99)
HCT VFR BLD CALC: 39.9 % (ref 36.3–47.1)
HDLC SERPL-MCNC: 78 MG/DL
HEMOGLOBIN: 12.1 G/DL (ref 11.9–15.1)
IMMATURE GRANULOCYTES: 0 %
LDL CHOLESTEROL: 70 MG/DL (ref 0–130)
LYMPHOCYTES # BLD: 35 % (ref 24–43)
MCH RBC QN AUTO: 30 PG (ref 25.2–33.5)
MCHC RBC AUTO-ENTMCNC: 30.3 G/DL (ref 28.4–34.8)
MCV RBC AUTO: 99 FL (ref 82.6–102.9)
MICROALBUMIN/CREAT 24H UR: <12 MG/L
MICROALBUMIN/CREAT UR-RTO: NORMAL MCG/MG CREAT
MONOCYTES # BLD: 7 % (ref 3–12)
NRBC AUTOMATED: 0 PER 100 WBC
PDW BLD-RTO: 12.5 % (ref 11.8–14.4)
PLATELET # BLD: 209 K/UL (ref 138–453)
PLATELET ESTIMATE: NORMAL
PMV BLD AUTO: 10.5 FL (ref 8.1–13.5)
POTASSIUM SERPL-SCNC: 4.7 MMOL/L (ref 3.7–5.3)
RBC # BLD: 4.03 M/UL (ref 3.95–5.11)
RBC # BLD: NORMAL 10*6/UL
SEG NEUTROPHILS: 56 % (ref 36–65)
SEGMENTED NEUTROPHILS ABSOLUTE COUNT: 4.53 K/UL (ref 1.5–8.1)
SODIUM BLD-SCNC: 141 MMOL/L (ref 135–144)
TOTAL PROTEIN: 7.3 G/DL (ref 6.4–8.3)
TRIGLYCERIDE, FASTING: 85 MG/DL
VLDLC SERPL CALC-MCNC: NORMAL MG/DL (ref 1–30)
WBC # BLD: 8.1 K/UL (ref 3.5–11.3)
WBC # BLD: NORMAL 10*3/UL

## 2021-05-15 PROCEDURE — 73090 X-RAY EXAM OF FOREARM: CPT

## 2021-05-15 PROCEDURE — 82043 UR ALBUMIN QUANTITATIVE: CPT

## 2021-05-15 PROCEDURE — 80053 COMPREHEN METABOLIC PANEL: CPT

## 2021-05-15 PROCEDURE — 85025 COMPLETE CBC W/AUTO DIFF WBC: CPT

## 2021-05-15 PROCEDURE — 83036 HEMOGLOBIN GLYCOSYLATED A1C: CPT

## 2021-05-15 PROCEDURE — 82570 ASSAY OF URINE CREATININE: CPT

## 2021-05-15 PROCEDURE — 36415 COLL VENOUS BLD VENIPUNCTURE: CPT

## 2021-05-15 PROCEDURE — 80061 LIPID PANEL: CPT

## 2021-05-16 LAB
ESTIMATED AVERAGE GLUCOSE: 123 MG/DL
HBA1C MFR BLD: 5.9 % (ref 4–6)

## 2021-07-21 ENCOUNTER — HOSPITAL ENCOUNTER (OUTPATIENT)
Age: 86
Setting detail: SPECIMEN
Discharge: HOME | End: 2021-07-21
Payer: COMMERCIAL

## 2021-07-21 LAB
-: ABNORMAL
AMORPHOUS: ABNORMAL
ANION GAP SERPL CALCULATED.3IONS-SCNC: 15 MMOL/L (ref 9–17)
BACTERIA: ABNORMAL
BILIRUBIN URINE: NEGATIVE
BUN BLDV-MCNC: 17 MG/DL (ref 8–23)
BUN/CREAT BLD: ABNORMAL (ref 9–20)
CALCIUM SERPL-MCNC: 8.8 MG/DL (ref 8.6–10.4)
CASTS UA: ABNORMAL /LPF (ref 0–8)
CHLORIDE BLD-SCNC: 106 MMOL/L (ref 98–107)
CO2: 20 MMOL/L (ref 20–31)
COLOR: YELLOW
COMMENT UA: ABNORMAL
CREAT SERPL-MCNC: 1.28 MG/DL (ref 0.5–0.9)
CREATININE URINE: 187.9 MG/DL (ref 28–217)
CRYSTALS, UA: ABNORMAL /HPF
EPITHELIAL CELLS UA: ABNORMAL /HPF (ref 0–5)
GFR AFRICAN AMERICAN: 48 ML/MIN
GFR NON-AFRICAN AMERICAN: 40 ML/MIN
GFR SERPL CREATININE-BSD FRML MDRD: ABNORMAL ML/MIN/{1.73_M2}
GFR SERPL CREATININE-BSD FRML MDRD: ABNORMAL ML/MIN/{1.73_M2}
GLUCOSE BLD-MCNC: 106 MG/DL (ref 70–99)
GLUCOSE URINE: NEGATIVE
HCT VFR BLD CALC: 38 % (ref 36.3–47.1)
HEMOGLOBIN: 11.8 G/DL (ref 11.9–15.1)
KETONES, URINE: NEGATIVE
LEUKOCYTE ESTERASE, URINE: ABNORMAL
MAGNESIUM: 2 MG/DL (ref 1.6–2.6)
MCH RBC QN AUTO: 30.3 PG (ref 25.2–33.5)
MCHC RBC AUTO-ENTMCNC: 31.1 G/DL (ref 28.4–34.8)
MCV RBC AUTO: 97.4 FL (ref 82.6–102.9)
MUCUS: ABNORMAL
NITRITE, URINE: POSITIVE
NRBC AUTOMATED: 0 PER 100 WBC
OTHER OBSERVATIONS UA: ABNORMAL
PDW BLD-RTO: 12.2 % (ref 11.8–14.4)
PH UA: 5 (ref 5–8)
PHOSPHORUS: 3.2 MG/DL (ref 2.6–4.5)
PLATELET # BLD: 218 K/UL (ref 138–453)
PMV BLD AUTO: 10.4 FL (ref 8.1–13.5)
POTASSIUM SERPL-SCNC: 4.6 MMOL/L (ref 3.7–5.3)
PROTEIN UA: ABNORMAL
RBC # BLD: 3.9 M/UL (ref 3.95–5.11)
RBC UA: ABNORMAL /HPF (ref 0–4)
RENAL EPITHELIAL, UA: ABNORMAL /HPF
SODIUM BLD-SCNC: 141 MMOL/L (ref 135–144)
SPECIFIC GRAVITY UA: 1.02 (ref 1–1.03)
TOTAL PROTEIN, URINE: 40 MG/DL
TRICHOMONAS: ABNORMAL
TURBIDITY: ABNORMAL
URINE HGB: ABNORMAL
URINE TOTAL PROTEIN CREATININE RATIO: 0.21 (ref 0–0.2)
UROBILINOGEN, URINE: NORMAL
WBC # BLD: 8 K/UL (ref 3.5–11.3)
WBC UA: ABNORMAL /HPF (ref 0–5)
YEAST: ABNORMAL

## 2021-07-21 PROCEDURE — 82570 ASSAY OF URINE CREATININE: CPT

## 2021-07-21 PROCEDURE — 84100 ASSAY OF PHOSPHORUS: CPT

## 2021-07-21 PROCEDURE — 85027 COMPLETE CBC AUTOMATED: CPT

## 2021-07-21 PROCEDURE — 83735 ASSAY OF MAGNESIUM: CPT

## 2021-07-21 PROCEDURE — 81001 URINALYSIS AUTO W/SCOPE: CPT

## 2021-07-21 PROCEDURE — 36415 COLL VENOUS BLD VENIPUNCTURE: CPT

## 2021-07-21 PROCEDURE — 87077 CULTURE AEROBIC IDENTIFY: CPT

## 2021-07-21 PROCEDURE — 80048 BASIC METABOLIC PNL TOTAL CA: CPT

## 2021-07-21 PROCEDURE — 87186 SC STD MICRODIL/AGAR DIL: CPT

## 2021-07-21 PROCEDURE — 87086 URINE CULTURE/COLONY COUNT: CPT

## 2021-07-21 PROCEDURE — 84156 ASSAY OF PROTEIN URINE: CPT

## 2021-07-22 LAB
CULTURE: ABNORMAL
Lab: ABNORMAL
SPECIMEN DESCRIPTION: ABNORMAL

## 2021-08-02 ENCOUNTER — HOSPITAL ENCOUNTER (OUTPATIENT)
Dept: ULTRASOUND IMAGING | Age: 86
Discharge: HOME | End: 2021-08-04
Payer: MEDICARE

## 2021-08-02 DIAGNOSIS — N18.30 STAGE 3 CHRONIC KIDNEY DISEASE, UNSPECIFIED WHETHER STAGE 3A OR 3B CKD (HCC): ICD-10-CM

## 2021-08-02 PROCEDURE — 76770 US EXAM ABDO BACK WALL COMP: CPT

## 2021-09-22 ENCOUNTER — HOSPITAL ENCOUNTER (OUTPATIENT)
Age: 86
Discharge: HOME | End: 2021-09-22
Payer: COMMERCIAL

## 2021-09-22 LAB
-: ABNORMAL
ABSOLUTE EOS #: 0.13 K/UL (ref 0–0.44)
ABSOLUTE IMMATURE GRANULOCYTE: <0.03 K/UL (ref 0–0.3)
ABSOLUTE LYMPH #: 3.02 K/UL (ref 1.1–3.7)
ABSOLUTE MONO #: 0.46 K/UL (ref 0.1–1.2)
AMORPHOUS: ABNORMAL
ANION GAP SERPL CALCULATED.3IONS-SCNC: 14 MMOL/L (ref 9–17)
BACTERIA: ABNORMAL
BASOPHILS # BLD: 0 % (ref 0–2)
BASOPHILS ABSOLUTE: <0.03 K/UL (ref 0–0.2)
BILIRUBIN URINE: NEGATIVE
BUN BLDV-MCNC: 18 MG/DL (ref 8–23)
BUN/CREAT BLD: 16 (ref 9–20)
CALCIUM SERPL-MCNC: 9.3 MG/DL (ref 8.6–10.4)
CASTS UA: ABNORMAL /LPF (ref 0–8)
CHLORIDE BLD-SCNC: 105 MMOL/L (ref 98–107)
CO2: 22 MMOL/L (ref 20–31)
COLOR: YELLOW
COMMENT UA: ABNORMAL
COMPLEMENT C3: 139 MG/DL (ref 90–180)
COMPLEMENT C4: 32 MG/DL (ref 10–40)
CREAT SERPL-MCNC: 1.15 MG/DL (ref 0.5–0.9)
CREATININE URINE: 73.3 MG/DL (ref 28–217)
CRYSTALS, UA: ABNORMAL /HPF
DIFFERENTIAL TYPE: NORMAL
EOSINOPHILS RELATIVE PERCENT: 2 % (ref 1–4)
EPITHELIAL CELLS UA: ABNORMAL /HPF (ref 0–5)
FREE KAPPA/LAMBDA RATIO: 0.78 (ref 0.26–1.65)
GFR AFRICAN AMERICAN: 55 ML/MIN
GFR NON-AFRICAN AMERICAN: 45 ML/MIN
GFR SERPL CREATININE-BSD FRML MDRD: ABNORMAL ML/MIN/{1.73_M2}
GFR SERPL CREATININE-BSD FRML MDRD: ABNORMAL ML/MIN/{1.73_M2}
GLUCOSE BLD-MCNC: 108 MG/DL (ref 70–99)
GLUCOSE URINE: NEGATIVE
HBV SURFACE AB TITR SER: <3.5 MIU/ML
HCT VFR BLD CALC: 37.9 % (ref 36.3–47.1)
HEMOGLOBIN: 12.1 G/DL (ref 11.9–15.1)
HEPATITIS B SURFACE ANTIGEN: NONREACTIVE
HEPATITIS C ANTIBODY: NONREACTIVE
IMMATURE GRANULOCYTES: 0 %
KAPPA FREE LIGHT CHAINS QNT: 2.61 MG/DL (ref 0.37–1.94)
KETONES, URINE: NEGATIVE
LAMBDA FREE LIGHT CHAINS QNT: 3.35 MG/DL (ref 0.57–2.63)
LEUKOCYTE ESTERASE, URINE: ABNORMAL
LYMPHOCYTES # BLD: 40 % (ref 24–43)
MAGNESIUM: 2 MG/DL (ref 1.6–2.6)
MCH RBC QN AUTO: 29.7 PG (ref 25.2–33.5)
MCHC RBC AUTO-ENTMCNC: 31.9 G/DL (ref 28.4–34.8)
MCV RBC AUTO: 93.1 FL (ref 82.6–102.9)
MONOCYTES # BLD: 6 % (ref 3–12)
MUCUS: ABNORMAL
NITRITE, URINE: POSITIVE
NRBC AUTOMATED: 0 PER 100 WBC
OTHER OBSERVATIONS UA: ABNORMAL
PDW BLD-RTO: 12.2 % (ref 11.8–14.4)
PH UA: 5 (ref 5–8)
PHOSPHORUS: 3.5 MG/DL (ref 2.6–4.5)
PLATELET # BLD: 217 K/UL (ref 138–453)
PLATELET ESTIMATE: NORMAL
PMV BLD AUTO: 10.4 FL (ref 8.1–13.5)
POTASSIUM SERPL-SCNC: 4.4 MMOL/L (ref 3.7–5.3)
PROTEIN UA: NEGATIVE
PTH INTACT: 90.21 PG/ML (ref 15–65)
RBC # BLD: 4.07 M/UL (ref 3.95–5.11)
RBC # BLD: NORMAL 10*6/UL
RBC UA: ABNORMAL /HPF (ref 0–4)
RENAL EPITHELIAL, UA: ABNORMAL /HPF
RHEUMATOID FACTOR: <10 IU/ML
SEG NEUTROPHILS: 52 % (ref 36–65)
SEGMENTED NEUTROPHILS ABSOLUTE COUNT: 3.93 K/UL (ref 1.5–8.1)
SODIUM BLD-SCNC: 141 MMOL/L (ref 135–144)
SPECIFIC GRAVITY UA: 1.01 (ref 1–1.03)
TOTAL PROTEIN, URINE: 13 MG/DL
TRICHOMONAS: ABNORMAL
TURBIDITY: CLEAR
URINE HGB: ABNORMAL
URINE TOTAL PROTEIN CREATININE RATIO: 0.18 (ref 0–0.2)
UROBILINOGEN, URINE: NORMAL
VITAMIN D 25-HYDROXY: 29.8 NG/ML (ref 30–100)
WBC # BLD: 7.6 K/UL (ref 3.5–11.3)
WBC # BLD: NORMAL 10*3/UL
WBC UA: ABNORMAL /HPF (ref 0–5)
YEAST: ABNORMAL

## 2021-09-22 PROCEDURE — 86317 IMMUNOASSAY INFECTIOUS AGENT: CPT

## 2021-09-22 PROCEDURE — 85025 COMPLETE CBC W/AUTO DIFF WBC: CPT

## 2021-09-22 PROCEDURE — 80048 BASIC METABOLIC PNL TOTAL CA: CPT

## 2021-09-22 PROCEDURE — 87086 URINE CULTURE/COLONY COUNT: CPT

## 2021-09-22 PROCEDURE — 86038 ANTINUCLEAR ANTIBODIES: CPT

## 2021-09-22 PROCEDURE — 82570 ASSAY OF URINE CREATININE: CPT

## 2021-09-22 PROCEDURE — 86225 DNA ANTIBODY NATIVE: CPT

## 2021-09-22 PROCEDURE — 86803 HEPATITIS C AB TEST: CPT

## 2021-09-22 PROCEDURE — 84156 ASSAY OF PROTEIN URINE: CPT

## 2021-09-22 PROCEDURE — 86431 RHEUMATOID FACTOR QUANT: CPT

## 2021-09-22 PROCEDURE — 83516 IMMUNOASSAY NONANTIBODY: CPT

## 2021-09-22 PROCEDURE — 82595 ASSAY OF CRYOGLOBULIN: CPT

## 2021-09-22 PROCEDURE — 84100 ASSAY OF PHOSPHORUS: CPT

## 2021-09-22 PROCEDURE — 87077 CULTURE AEROBIC IDENTIFY: CPT

## 2021-09-22 PROCEDURE — 84165 PROTEIN E-PHORESIS SERUM: CPT

## 2021-09-22 PROCEDURE — 81001 URINALYSIS AUTO W/SCOPE: CPT

## 2021-09-22 PROCEDURE — 87340 HEPATITIS B SURFACE AG IA: CPT

## 2021-09-22 PROCEDURE — 87186 SC STD MICRODIL/AGAR DIL: CPT

## 2021-09-22 PROCEDURE — 84155 ASSAY OF PROTEIN SERUM: CPT

## 2021-09-22 PROCEDURE — 83735 ASSAY OF MAGNESIUM: CPT

## 2021-09-22 PROCEDURE — 86160 COMPLEMENT ANTIGEN: CPT

## 2021-09-22 PROCEDURE — 83970 ASSAY OF PARATHORMONE: CPT

## 2021-09-22 PROCEDURE — 83883 ASSAY NEPHELOMETRY NOT SPEC: CPT

## 2021-09-22 PROCEDURE — 82306 VITAMIN D 25 HYDROXY: CPT

## 2021-09-22 PROCEDURE — 36415 COLL VENOUS BLD VENIPUNCTURE: CPT

## 2021-09-23 LAB
ALBUMIN (CALCULATED): 4 G/DL (ref 3.2–5.2)
ALBUMIN PERCENT: 59 % (ref 45–65)
ALPHA 1 PERCENT: 3 % (ref 3–6)
ALPHA 2 PERCENT: 14 % (ref 6–13)
ALPHA-1-GLOBULIN: 0.2 G/DL (ref 0.1–0.4)
ALPHA-2-GLOBULIN: 0.9 G/DL (ref 0.5–0.9)
ANCA MYELOPEROXIDASE: <0.3 AU/ML (ref 0–3.5)
ANCA PROTEINASE 3: <0.7 AU/ML (ref 0–2)
ANTI DNA DOUBLE STRANDED: 1.6 IU/ML
ANTI-NUCLEAR ANTIBODY (ANA): NEGATIVE
BETA GLOBULIN: 0.7 G/DL (ref 0.5–1.1)
BETA PERCENT: 11 % (ref 11–19)
ENA ANTIBODIES SCREEN: 0.3 U/ML
GAMMA GLOBULIN %: 14 % (ref 9–20)
GAMMA GLOBULIN: 0.9 G/DL (ref 0.5–1.5)
GBM ANTIBODY IGG: <2 AU/ML (ref 0–7)
PATHOLOGIST: ABNORMAL
PROTEIN ELECTROPHORESIS, SERUM: ABNORMAL
TOTAL PROT. SUM,%: 101 % (ref 98–102)
TOTAL PROT. SUM: 6.7 G/DL (ref 6.3–8.2)
TOTAL PROTEIN: 6.7 G/DL (ref 6.4–8.3)

## 2021-09-24 LAB
CULTURE: ABNORMAL
Lab: ABNORMAL
SPECIMEN DESCRIPTION: ABNORMAL

## 2021-09-25 LAB — MYELOPEROXIDASE AB: 0 AU/ML (ref 0–19)

## 2021-09-27 LAB — CRYOGLOBULIN: 0 MG/DL (ref 0–10)

## 2021-12-10 ENCOUNTER — HOSPITAL ENCOUNTER (OUTPATIENT)
Age: 86
Discharge: HOME | End: 2021-12-10
Payer: COMMERCIAL

## 2021-12-10 LAB
ALBUMIN SERPL-MCNC: 4.1 G/DL (ref 3.5–5.2)
ALBUMIN/GLOBULIN RATIO: 1.4 (ref 1–2.5)
ALP BLD-CCNC: 70 U/L (ref 35–104)
ALT SERPL-CCNC: 13 U/L (ref 5–33)
ANION GAP SERPL CALCULATED.3IONS-SCNC: 14 MMOL/L (ref 9–17)
AST SERPL-CCNC: 16 U/L
BILIRUB SERPL-MCNC: 0.3 MG/DL (ref 0.3–1.2)
BUN BLDV-MCNC: 26 MG/DL (ref 8–23)
BUN/CREAT BLD: ABNORMAL (ref 9–20)
CALCIUM SERPL-MCNC: 9.4 MG/DL (ref 8.6–10.4)
CHLORIDE BLD-SCNC: 108 MMOL/L (ref 98–107)
CHOLESTEROL, FASTING: 213 MG/DL
CHOLESTEROL/HDL RATIO: 3
CO2: 19 MMOL/L (ref 20–31)
CREAT SERPL-MCNC: 1.28 MG/DL (ref 0.5–0.9)
CREATININE URINE: 124.2 MG/DL (ref 28–217)
ESTIMATED AVERAGE GLUCOSE: 126 MG/DL
GFR AFRICAN AMERICAN: 48 ML/MIN
GFR NON-AFRICAN AMERICAN: 40 ML/MIN
GFR SERPL CREATININE-BSD FRML MDRD: ABNORMAL ML/MIN/{1.73_M2}
GFR SERPL CREATININE-BSD FRML MDRD: ABNORMAL ML/MIN/{1.73_M2}
GLUCOSE BLD-MCNC: 103 MG/DL (ref 70–99)
HBA1C MFR BLD: 6 % (ref 4–6)
HDLC SERPL-MCNC: 72 MG/DL
LDL CHOLESTEROL: 117 MG/DL (ref 0–130)
MICROALBUMIN/CREAT 24H UR: <12 MG/L
MICROALBUMIN/CREAT UR-RTO: NORMAL MCG/MG CREAT
POTASSIUM SERPL-SCNC: 5.1 MMOL/L (ref 3.7–5.3)
SODIUM BLD-SCNC: 141 MMOL/L (ref 135–144)
TOTAL PROTEIN: 7 G/DL (ref 6.4–8.3)
TRIGLYCERIDE, FASTING: 118 MG/DL
VLDLC SERPL CALC-MCNC: ABNORMAL MG/DL (ref 1–30)

## 2021-12-10 PROCEDURE — 80053 COMPREHEN METABOLIC PANEL: CPT

## 2021-12-10 PROCEDURE — 83036 HEMOGLOBIN GLYCOSYLATED A1C: CPT

## 2021-12-10 PROCEDURE — 36415 COLL VENOUS BLD VENIPUNCTURE: CPT

## 2021-12-10 PROCEDURE — 80061 LIPID PANEL: CPT

## 2021-12-10 PROCEDURE — 82570 ASSAY OF URINE CREATININE: CPT

## 2021-12-10 PROCEDURE — 82043 UR ALBUMIN QUANTITATIVE: CPT

## 2022-02-27 ENCOUNTER — HOSPITAL ENCOUNTER (EMERGENCY)
Age: 87
Discharge: HOME | End: 2022-02-27
Attending: EMERGENCY MEDICINE
Payer: COMMERCIAL

## 2022-02-27 VITALS
DIASTOLIC BLOOD PRESSURE: 74 MMHG | WEIGHT: 170 LBS | HEIGHT: 60 IN | OXYGEN SATURATION: 99 % | TEMPERATURE: 97.7 F | SYSTOLIC BLOOD PRESSURE: 173 MMHG | BODY MASS INDEX: 33.38 KG/M2 | RESPIRATION RATE: 16 BRPM | HEART RATE: 78 BPM

## 2022-02-27 DIAGNOSIS — M54.2 MUSCULOSKELETAL NECK PAIN: Primary | ICD-10-CM

## 2022-02-27 PROCEDURE — 99283 EMERGENCY DEPT VISIT LOW MDM: CPT

## 2022-02-27 RX ORDER — NAPROXEN 250 MG/1
250 TABLET ORAL 2 TIMES DAILY WITH MEALS
Qty: 10 TABLET | Refills: 0 | Status: SHIPPED | OUTPATIENT
Start: 2022-02-27 | End: 2022-02-27 | Stop reason: SDUPTHER

## 2022-02-27 RX ORDER — TIZANIDINE 2 MG/1
2 TABLET ORAL 2 TIMES DAILY PRN
Qty: 10 TABLET | Refills: 0 | Status: SHIPPED | OUTPATIENT
Start: 2022-02-27 | End: 2022-03-04

## 2022-02-27 RX ORDER — TIZANIDINE 2 MG/1
2 TABLET ORAL 2 TIMES DAILY PRN
Qty: 10 TABLET | Refills: 0 | Status: SHIPPED | OUTPATIENT
Start: 2022-02-27 | End: 2022-02-27 | Stop reason: SDUPTHER

## 2022-02-27 RX ORDER — NAPROXEN 250 MG/1
250 TABLET ORAL 2 TIMES DAILY WITH MEALS
Qty: 10 TABLET | Refills: 0 | Status: SHIPPED | OUTPATIENT
Start: 2022-02-27 | End: 2022-03-04

## 2022-02-27 ASSESSMENT — PAIN SCALES - GENERAL: PAINLEVEL_OUTOF10: 3

## 2022-02-27 ASSESSMENT — ENCOUNTER SYMPTOMS
COLOR CHANGE: 0
SHORTNESS OF BREATH: 0

## 2022-02-27 ASSESSMENT — VISUAL ACUITY: OU: 1

## 2022-02-27 NOTE — ED PROVIDER NOTES
[address] [place] [address] ED  EMERGENCY DEPARTMENT ENCOUNTER      Pt Name: Eliana Tanner  MRN: [id number]  [place] 1935  Date of evaluation: 2/27/2022  Provider: GWENDOLYN Thakur 2074       Chief Complaint   Patient presents with    Neck Pain    Otalgia     L side         HISTORY OFPRESENT ILLNESS  (Location/Symptom, Timing/Onset, Context/Setting, Quality, Duration, Modifying Factors, Severity.)   Eliana Tanner is a 82 y.o. female who presents to the emergency department by private auto for evaluation of left-sided neck pain for the past 5 days. Patient states when she woke up in the morning 5 days ago she had pain left side of her neck. Aggravated with certain movement. Denies injury or fall. 4-10 pain. Straight as a throbbing sensation. Denies headache, visual disturbance, numbness tingling in her arms, chest pain or shortness of breath. Nursing Notes were reviewed. PASTMEDICAL HISTORY     Past Medical History:   Diagnosis Date    Asthma     Diabetes mellitus ([address]     Hypertension          SURGICAL HISTORY       Past Surgical History:   Procedure Laterality Date    HERNIA REPAIR      TOTAL KNEE ARTHROPLASTY Bilateral 2004    TUBAL LIGATION           CURRENT MEDICATIONS     Current Discharge Medication List      CONTINUE these medications which have NOT CHANGED    Details   lisinopril (PRINIVIL;ZESTRIL) 5 MG tablet Take 5 mg by mouth daily       budesonide-formoterol (SYMBICORT) 160-4.5 MCG/ACT AERO Inhale 2 puffs into the lungs 2 times daily      acetaminophen (TYLENOL) 500 MG tablet Take 500 mg by mouth as needed for Pain             ALLERGIES     Patient has no known allergies.     FAMILY HISTORY       Family History   Problem Relation Age of Onset    Cancer Mother     Alzheimer's Disease Mother    [place] Cancer Father     Diabetes Sister     Cancer Brother     Diabetes Sister           SOCIAL HISTORY       Social History     Socioeconomic History    Marital status:      Spouse name: None    Number of children: None    Years of education: None    Highest education level: None   Occupational History    None   Tobacco Use    Smoking status: Passive Smoke Exposure - Never Smoker    Smokeless tobacco: Never Used   Substance and Sexual Activity    Alcohol use: No     Alcohol/week: 0.0 standard drinks    Drug use: No    Sexual activity: None   Other Topics Concern    None   Social History Narrative    None     Social Determinants of Health     Financial Resource Strain:     Difficulty of Paying Living Expenses: Not on file   Food Insecurity:     Worried About Running Out of Food in the Last Year: Not on file    Ny of Food in the Last Year: Not on file   Transportation Needs:     Lack of Transportation (Medical): Not on file    Lack of Transportation (Non-Medical): Not on file   Physical Activity:     Days of Exercise per Week: Not on file    Minutes of Exercise per Session: Not on file   Stress:     Feeling of Stress : Not on file   Social Connections:     Frequency of Communication with Friends and Family: Not on file    Frequency of Social Gatherings with Friends and Family: Not on file    Attends Alevism Services: Not on file    Active Member of [address] [place] [address] or Organizations: Not on file    Attends Club or Organization Meetings: Not on file    Marital Status: Not on file   Intimate Partner Violence:     Fear of Current or Ex-Partner: Not on file    Emotionally Abused: Not on file    Physically Abused: Not on file    Sexually Abused: Not on file   Housing Stability:     Unable to Pay for Housing in the Last Year: Not on file    Number of [place] in the Last Year: Not on file    Unstable Housing in the Last Year: Not on file         REVIEW OF SYSTEMS    (2-9 systems for level 4, 10 or more for level 5)     Review of Systems   Constitutional: Negative for fever. Eyes: Negative for visual disturbance.    Respiratory: Negative for shortness of breath. Cardiovascular: Negative for chest pain. Musculoskeletal: Positive for neck pain. Skin: Negative for color change. Neurological: Negative for dizziness, facial asymmetry, speech difficulty, weakness, light-headedness, numbness and headaches. All other systems reviewed and are negative. Except as noted above the remainder of the review of systems was reviewed and negative. PHYSICAL EXAM    (up to 7 for level 4, 8 or more for level 5)     ED Triage Vitals [02/27/22 1023]   BP Temp Temp Source Pulse Resp SpO2 Height Weight   (!) 173/74 97.7 °F (36.5 °C) Oral 78 16 99 % 5' (1.524 m) 170 lb (77.1 kg)       Physical Exam  Constitutional:       General: She is not in acute distress. Appearance: Normal appearance. She is well-developed, well-groomed and normal weight. HENT:      Head: Normocephalic and atraumatic. Right Ear: Hearing, tympanic membrane, ear canal and external ear normal.      Left Ear: Hearing, tympanic membrane, ear canal and external ear normal.      Mouth/Throat:      Lips: Pink. Mouth: Mucous membranes are moist.      Dentition: Abnormal dentition. Dental caries present. No dental tenderness, gingival swelling or dental abscesses. Pharynx: Oropharynx is clear. Uvula midline. No pharyngeal swelling or uvula swelling. Comments: Patient has multiple dental caries and abnormal dentition. No abscess or gingival swelling. Eyes:      General: Lids are normal. Vision grossly intact. Extraocular Movements: Extraocular movements intact. Conjunctiva/sclera: Conjunctivae normal.      Pupils: Pupils are equal, round, and reactive to light. Neck:        Comments: Tenderness to the left side of the neck to palpation. No erythema, rash, ecchymosis or swelling. He has pain to left-sided neck when turning head to the right. No midline cervical tenderness. Pulmonary:      Effort: Pulmonary effort is normal. No respiratory distress.    Musculoskeletal: General: Normal range of motion. Cervical back: No edema, erythema, rigidity or crepitus. Pain with movement and muscular tenderness present. No spinous process tenderness. Skin:     General: Skin is warm and dry. Capillary Refill: Capillary refill takes less than 2 seconds. Findings: No bruising, erythema or rash. Neurological:      Mental Status: She is alert and oriented to person, place, and time. Cranial Nerves: Cranial nerves are intact. Sensory: Sensation is intact. Motor: Motor function is intact. Psychiatric:         Mood and Affect: Mood normal.         Behavior: Behavior normal.         Thought Content: Thought content normal.         Judgment: Judgment normal.           DIAGNOSTIC RESULTS     EKG:All EKG's are interpreted by the Emergency Department Physician who either signs or Co-signs this chart in the absence of a cardiologist.    Not indicated    RADIOLOGY:   Non-plain film images such as CT, Ultrasound and MRI are read by theradiologist. Plain radiographic images are visualized and preliminarily interpreted by the emergency physician with the below findings:    Not indicated    Interpretation per the Radiologist below, if available at the time of this note:    No orders to display         EDBEDSIDE ULTRASOUND:   Performed by Nadia Noble - none    LABS:  Labs Reviewed - No data to display    All other labs were within normal range or not returned as of this dictation. EMERGENCY DEPARTMENT COURSE andDIFFERENTIAL DIAGNOSIS/MDM:   Examination shows musculoskeletal pain to the left side of the neck. No midline cervical tenderness. Pain aggravated when turning her head to the right. Denies injury. Symptoms possibly from sleeping wrong on her neck. Imaging not indicated. No chest pain or shortness of breath. Lung sounds clear to auscultation. No numbness tingling in arms. No headache or dizziness. Prescriptions written for tizanidine and naproxen. Discussed diagnosis and follow-up care with the patient and the patient's daughter. Follow-up with PCP. Return precautions provided. Vitals:    Vitals:    02/27/22 1023   BP: (!) 173/74   Pulse: 78   Resp: 16   Temp: 97.7 °F (36.5 °C)   TempSrc: Oral   SpO2: 99%   Weight: 170 lb (77.1 kg)   Height: 5' (1.524 m)         CONSULTS:  None    RES:  Procedures    FINAL IMPRESSION      1.  Musculoskeletal neck pain          DISPOSITION/PLAN   DISPOSITION Decision To Discharge 02/27/2022 10:52:38 AM      PATIENT REFERRED TO:   Venessa Edmond MD  [address]  [phone]    In 1 week      [facility] ED  [address] [address]  [phone]    If symptoms worsen      DISCHARGE MEDICATIONS:     Current Discharge Medication List      START taking these medications    Details   naproxen (NAPROSYN) 250 MG tablet Take 1 tablet by mouth 2 times daily (with meals) for 5 days  Qty: 10 tablet, Refills: 0      tiZANidine (ZANAFLEX) 2 MG tablet Take 1 tablet by mouth 2 times daily as needed (neck pain)  Qty: 10 tablet, Refills: 0           Electronically signed by GWENDOLYN Light 2/27/2022 at 10:56 AM           GWENDOLYN Light CNP  02/27/22 1102

## 2022-02-27 NOTE — ED PROVIDER NOTES
eMERGENCY dEPARTMENT eNCOUnter   [address] [place] Name: Tricia Lopez  MRN: [id number]  [place] 1935  Date of evaluation: 2/27/22     Tricia Lopez is a 82 y.o. female with CC: Neck Pain and Otalgia (L side)      This visit was performed by both a physician and an APC. I performed all aspects of the MDM as documented. The care is provided during an unprecedented national emergency due to the novel coronavirus, COVID 19.     Aron Altamirano DO  Attending Emergency Physician                    Hailey Mckeon 1721,   02/27/22 1147

## 2022-04-23 ENCOUNTER — HOSPITAL ENCOUNTER (OUTPATIENT)
Age: 87
Discharge: HOME | End: 2022-04-23
Payer: MEDICARE

## 2022-04-23 LAB
-: ABNORMAL
ANION GAP SERPL CALCULATED.3IONS-SCNC: 12 MMOL/L (ref 9–17)
BACTERIA: ABNORMAL
BILIRUBIN URINE: NEGATIVE
BUN BLDV-MCNC: 18 MG/DL (ref 8–23)
CALCIUM SERPL-MCNC: 9.4 MG/DL (ref 8.6–10.4)
CASTS UA: ABNORMAL /LPF (ref 0–8)
CHLORIDE BLD-SCNC: 105 MMOL/L (ref 98–107)
CO2: 24 MMOL/L (ref 20–31)
COLOR: YELLOW
CREAT SERPL-MCNC: 1.4 MG/DL (ref 0.5–0.9)
CREATININE URINE: 58.2 MG/DL (ref 28–217)
EPITHELIAL CELLS UA: ABNORMAL /HPF (ref 0–5)
GFR AFRICAN AMERICAN: 43 ML/MIN
GFR NON-AFRICAN AMERICAN: 36 ML/MIN
GFR SERPL CREATININE-BSD FRML MDRD: ABNORMAL ML/MIN/{1.73_M2}
GLUCOSE BLD-MCNC: 115 MG/DL (ref 70–99)
GLUCOSE URINE: NEGATIVE
HCT VFR BLD CALC: 39.2 % (ref 36.3–47.1)
HEMOGLOBIN: 12.6 G/DL (ref 11.9–15.1)
KETONES, URINE: NEGATIVE
LEUKOCYTE ESTERASE, URINE: ABNORMAL
MAGNESIUM: 2 MG/DL (ref 1.6–2.6)
MCH RBC QN AUTO: 31 PG (ref 25.2–33.5)
MCHC RBC AUTO-ENTMCNC: 32.1 G/DL (ref 28.4–34.8)
MCV RBC AUTO: 96.6 FL (ref 82.6–102.9)
NITRITE, URINE: POSITIVE
NRBC AUTOMATED: 0 PER 100 WBC
PDW BLD-RTO: 12.6 % (ref 11.8–14.4)
PH UA: 5 (ref 5–8)
PHOSPHORUS: 3.1 MG/DL (ref 2.6–4.5)
PLATELET # BLD: 256 K/UL (ref 138–453)
PMV BLD AUTO: 10.2 FL (ref 8.1–13.5)
POTASSIUM SERPL-SCNC: 4.7 MMOL/L (ref 3.7–5.3)
PROTEIN UA: NEGATIVE
PTH INTACT: 128.1 PG/ML (ref 15–65)
RBC # BLD: 4.06 M/UL (ref 3.95–5.11)
RBC UA: ABNORMAL /HPF (ref 0–4)
SODIUM BLD-SCNC: 141 MMOL/L (ref 135–144)
SPECIFIC GRAVITY UA: 1.01 (ref 1–1.03)
TOTAL PROTEIN, URINE: 12 MG/DL
TURBIDITY: CLEAR
URINE HGB: NEGATIVE
URINE TOTAL PROTEIN CREATININE RATIO: 0.21 (ref 0–0.2)
UROBILINOGEN, URINE: NORMAL
VITAMIN D 25-HYDROXY: 22.2 NG/ML
WBC # BLD: 9.1 K/UL (ref 3.5–11.3)
WBC UA: ABNORMAL /HPF (ref 0–5)

## 2022-04-23 PROCEDURE — 87088 URINE BACTERIA CULTURE: CPT

## 2022-04-23 PROCEDURE — 87186 SC STD MICRODIL/AGAR DIL: CPT

## 2022-04-23 PROCEDURE — 83735 ASSAY OF MAGNESIUM: CPT

## 2022-04-23 PROCEDURE — 36415 COLL VENOUS BLD VENIPUNCTURE: CPT

## 2022-04-23 PROCEDURE — 84156 ASSAY OF PROTEIN URINE: CPT

## 2022-04-23 PROCEDURE — 85027 COMPLETE CBC AUTOMATED: CPT

## 2022-04-23 PROCEDURE — 80048 BASIC METABOLIC PNL TOTAL CA: CPT

## 2022-04-23 PROCEDURE — 87086 URINE CULTURE/COLONY COUNT: CPT

## 2022-04-23 PROCEDURE — 84100 ASSAY OF PHOSPHORUS: CPT

## 2022-04-23 PROCEDURE — 82570 ASSAY OF URINE CREATININE: CPT

## 2022-04-23 PROCEDURE — 81001 URINALYSIS AUTO W/SCOPE: CPT

## 2022-04-23 PROCEDURE — 83970 ASSAY OF PARATHORMONE: CPT

## 2022-04-23 PROCEDURE — 82306 VITAMIN D 25 HYDROXY: CPT

## 2022-04-25 LAB
CULTURE: ABNORMAL
SPECIMEN DESCRIPTION: ABNORMAL

## 2023-10-18 ENCOUNTER — HOSPITAL ENCOUNTER (OUTPATIENT)
Age: 88
Setting detail: SPECIMEN
Discharge: HOME | End: 2023-10-18
Payer: MEDICARE

## 2023-10-18 LAB
25(OH)D3 SERPL-MCNC: 24.5 NG/ML (ref 30–100)
ANION GAP SERPL CALCULATED.3IONS-SCNC: 10 MMOL/L (ref 9–16)
BACTERIA URNS QL MICRO: ABNORMAL
BILIRUB UR QL STRIP: NEGATIVE
BUN SERPL-MCNC: 31 MG/DL (ref 8–23)
CALCIUM SERPL-MCNC: 8.9 MG/DL (ref 8.6–10.4)
CASTS #/AREA URNS LPF: ABNORMAL /LPF (ref 0–8)
CHLORIDE SERPL-SCNC: 107 MMOL/L (ref 98–107)
CLARITY UR: CLEAR
CO2 SERPL-SCNC: 23 MMOL/L (ref 20–31)
COLOR UR: YELLOW
CREAT SERPL-MCNC: 1.6 MG/DL (ref 0.5–0.9)
CREAT UR-MCNC: 69.4 MG/DL (ref 28–217)
EPI CELLS #/AREA URNS HPF: ABNORMAL /HPF (ref 0–5)
ERYTHROCYTE [DISTWIDTH] IN BLOOD BY AUTOMATED COUNT: 13.2 % (ref 11.8–14.4)
GFR SERPL CREATININE-BSD FRML MDRD: 31 ML/MIN/1.73M2
GLUCOSE SERPL-MCNC: 94 MG/DL (ref 74–99)
GLUCOSE UR STRIP-MCNC: NEGATIVE MG/DL
HCT VFR BLD AUTO: 36.4 % (ref 36.3–47.1)
HGB BLD-MCNC: 11.3 G/DL (ref 11.9–15.1)
HGB UR QL STRIP.AUTO: NEGATIVE
KETONES UR STRIP-MCNC: NEGATIVE MG/DL
LEUKOCYTE ESTERASE UR QL STRIP: ABNORMAL
MAGNESIUM SERPL-MCNC: 2 MG/DL (ref 1.6–2.4)
MCH RBC QN AUTO: 31 PG (ref 25.2–33.5)
MCHC RBC AUTO-ENTMCNC: 31 G/DL (ref 28.4–34.8)
MCV RBC AUTO: 99.7 FL (ref 82.6–102.9)
NITRITE UR QL STRIP: POSITIVE
NRBC BLD-RTO: 0 PER 100 WBC
PH UR STRIP: 5 [PH] (ref 5–8)
PHOSPHATE SERPL-MCNC: 3.4 MG/DL (ref 2.5–4.5)
PLATELET # BLD AUTO: 245 K/UL (ref 138–453)
PMV BLD AUTO: 10.5 FL (ref 8.1–13.5)
POTASSIUM SERPL-SCNC: 4.9 MMOL/L (ref 3.7–5.3)
PROT UR STRIP-MCNC: NEGATIVE MG/DL
PTH-INTACT SERPL-MCNC: 119.5 PG/ML (ref 14–72)
RBC # BLD AUTO: 3.65 M/UL (ref 3.95–5.11)
RBC #/AREA URNS HPF: ABNORMAL /HPF (ref 0–4)
SODIUM SERPL-SCNC: 140 MMOL/L (ref 136–145)
SP GR UR STRIP: 1.01 (ref 1–1.03)
TOTAL PROTEIN, URINE: 15 MG/DL
URINE TOTAL PROTEIN CREATININE RATIO: 0.22 (ref 0–0.2)
UROBILINOGEN UR STRIP-ACNC: NORMAL EU/DL (ref 0–1)
WBC #/AREA URNS HPF: ABNORMAL /HPF (ref 0–5)
WBC OTHER # BLD: 6.6 K/UL (ref 3.5–11.3)

## 2023-10-18 PROCEDURE — 83735 ASSAY OF MAGNESIUM: CPT

## 2023-10-18 PROCEDURE — 82570 ASSAY OF URINE CREATININE: CPT

## 2023-10-18 PROCEDURE — 36415 COLL VENOUS BLD VENIPUNCTURE: CPT

## 2023-10-18 PROCEDURE — 83970 ASSAY OF PARATHORMONE: CPT

## 2023-10-18 PROCEDURE — 80048 BASIC METABOLIC PNL TOTAL CA: CPT

## 2023-10-18 PROCEDURE — 85027 COMPLETE CBC AUTOMATED: CPT

## 2023-10-18 PROCEDURE — 81001 URINALYSIS AUTO W/SCOPE: CPT

## 2023-10-18 PROCEDURE — 82306 VITAMIN D 25 HYDROXY: CPT

## 2023-10-18 PROCEDURE — 84156 ASSAY OF PROTEIN URINE: CPT

## 2023-10-18 PROCEDURE — 84100 ASSAY OF PHOSPHORUS: CPT

## 2023-11-30 ENCOUNTER — OFFICE VISIT (OUTPATIENT)
Dept: NEUROLOGY | Age: 88
End: 2023-11-30

## 2023-11-30 VITALS
DIASTOLIC BLOOD PRESSURE: 79 MMHG | BODY MASS INDEX: 24.94 KG/M2 | TEMPERATURE: 98.3 F | SYSTOLIC BLOOD PRESSURE: 135 MMHG | HEIGHT: 60 IN | HEART RATE: 84 BPM | WEIGHT: 127 LBS

## 2023-11-30 DIAGNOSIS — J44.9 CHRONIC OBSTRUCTIVE PULMONARY DISEASE, UNSPECIFIED COPD TYPE (HCC): ICD-10-CM

## 2023-11-30 DIAGNOSIS — I10 BENIGN ESSENTIAL HYPERTENSION: ICD-10-CM

## 2023-11-30 DIAGNOSIS — R41.89 COGNITIVE IMPAIRMENT: Primary | ICD-10-CM

## 2023-11-30 ASSESSMENT — ENCOUNTER SYMPTOMS
EYE REDNESS: 0
PHOTOPHOBIA: 0
VOMITING: 0
NAUSEA: 0
EYE PAIN: 0

## 2023-11-30 NOTE — PROGRESS NOTES
[address] [facility] [place] [address]  Dept: [phone]  Dept Fax: [phone]    NEUROLOGY NEW PATIENT NOTE       PATIENT NAME: Berlin Espana  PATIENT MRN: [id number]  PRIMARY CARE PHYSICIAN: Dwane Saint, MD      HPI:      Berlin Espana is a 82 y.o. female with a past medical history of diabetes, CKD stage IIIb, asthma, bilateral knee replacement, hypertension and depression. History was obtained from the patient and collateral history was obtained from the accompanying family and medical records. The patient lives with her 3 of her children and their spouses. Patient is currently Retired, used to be employed in "[employer]. Level of education [employer]. Patient was at her baseline until almost 2 years ago. Symptoms were initially noticed by the patient and family, have been going on for the past 2 years, and progressively getting worse. The patient and the family have noticed ongoing problems with learning and retaining new information, trouble remembering events or names, forgets appointments, forgetting schedules  The family reports problems with language or word finding difficulty,   denies episodes of  intermittent confusion,   reports changes in personality or changes in mood  denies difficulty with performing  learned tasks  The patient and family reports any hallucinations or behavior changes. They have noticed especially at night she is seeing individuals who are not there. Patient is able to manage his ADLs (activities of daily living) including showering, cooking and dressing. she also has noticed problems handling complex tasks for example balancing a checkbook, problems with reasoning i.e. unable to cope with unexpected events. Patient denies problems with spatial ability and orientation, specifically getting lost in familiar places.     Patient is currently not driving  No accidents

## 2023-12-13 ENCOUNTER — HOSPITAL ENCOUNTER (OUTPATIENT)
Age: 88
Discharge: HOME | End: 2023-12-13
Payer: MEDICARE

## 2023-12-13 ENCOUNTER — HOSPITAL ENCOUNTER (OUTPATIENT)
Dept: MRI IMAGING | Age: 88
Discharge: HOME | End: 2023-12-15
Payer: MEDICARE

## 2023-12-13 DIAGNOSIS — R41.89 COGNITIVE IMPAIRMENT: ICD-10-CM

## 2023-12-13 LAB
FOLATE SERPL-MCNC: 6.7 NG/ML (ref 4.8–24.2)
TSH SERPL DL<=0.05 MIU/L-ACNC: 1.47 UIU/ML (ref 0.27–4.2)
VIT B12 SERPL-MCNC: <150 PG/ML (ref 232–1245)

## 2023-12-13 PROCEDURE — 82746 ASSAY OF FOLIC ACID SERUM: CPT

## 2023-12-13 PROCEDURE — 84443 ASSAY THYROID STIM HORMONE: CPT

## 2023-12-13 PROCEDURE — 70551 MRI BRAIN STEM W/O DYE: CPT

## 2023-12-13 PROCEDURE — 36415 COLL VENOUS BLD VENIPUNCTURE: CPT

## 2023-12-13 PROCEDURE — 82607 VITAMIN B-12: CPT

## 2024-01-18 ENCOUNTER — OFFICE VISIT (OUTPATIENT)
Dept: NEUROLOGY | Age: 89
End: 2024-01-18

## 2024-01-18 VITALS
DIASTOLIC BLOOD PRESSURE: 67 MMHG | HEART RATE: 70 BPM | HEIGHT: 60 IN | BODY MASS INDEX: 24.94 KG/M2 | WEIGHT: 127 LBS | OXYGEN SATURATION: 96 % | SYSTOLIC BLOOD PRESSURE: 166 MMHG

## 2024-01-18 DIAGNOSIS — E53.8 LOW SERUM VITAMIN B12: ICD-10-CM

## 2024-01-18 DIAGNOSIS — R41.89 COGNITIVE IMPAIRMENT: Primary | ICD-10-CM

## 2024-01-18 DIAGNOSIS — I10 BENIGN ESSENTIAL HYPERTENSION: ICD-10-CM

## 2024-01-18 NOTE — PROGRESS NOTES
[address] [facility] [address]  [facility] [address]  Dept: [phone]  Dept Fax: [phone]    NEUROLOGY FOLLOW UP NOTE                                              PATIENT NAME: Zuleyka Pennington   PATIENT MRN: [id number]  FOLLOW UP TODAY: 1/18/2024        INITIAL & INTERVAL HISTORY:     Zuleyka Pennington is a 88 y.o. female here for follow up. Her family states that her cognitive impairment has been stable. She continues to manage her ADLs at home. Her vitamin B12 is <150. Her mood has been lower over the last couple of months surrounding the anniversary of the death of her . Daughters present and confirm that this is typical for her for this time of year. Otherwise mood is good at baseline.     MRI brain reveals moderate chronic microvascular changes    HPI    The patient lives with her 3 of her children and their spouses.   Patient is currently Retired, used to be employed in a restaurant.  Level of education [employer] School.     Patient was at her baseline until almost 2 years ago.  Symptoms were initially noticed by the patient and family, have been going on for the past 2 years, and progressively getting worse.     The patient and the family have noticed ongoing problems with learning and retaining new information, trouble remembering events or names, forgets appointments, forgetting schedules  The family reports problems with language or word finding difficulty,   denies episodes of  intermittent confusion,   reports changes in personality or changes in mood  denies difficulty with performing  learned tasks  The patient and family reports any hallucinations or behavior changes.  They have noticed especially at night she is seeing individuals who are not there.        Patient is able to manage his ADLs (activities of daily living) including showering, cooking and dressing.     she also has noticed problems handling complex

## 2024-05-23 ENCOUNTER — OFFICE VISIT (OUTPATIENT)
Dept: NEUROLOGY | Age: 89
End: 2024-05-23
Payer: MEDICARE

## 2024-05-23 VITALS
WEIGHT: 124 LBS | DIASTOLIC BLOOD PRESSURE: 77 MMHG | SYSTOLIC BLOOD PRESSURE: 121 MMHG | HEART RATE: 82 BPM | BODY MASS INDEX: 24.35 KG/M2 | HEIGHT: 60 IN

## 2024-05-23 DIAGNOSIS — F03.B0 MODERATE DEMENTIA WITHOUT BEHAVIORAL DISTURBANCE, PSYCHOTIC DISTURBANCE, MOOD DISTURBANCE, OR ANXIETY, UNSPECIFIED DEMENTIA TYPE (HCC): Primary | ICD-10-CM

## 2024-05-23 PROCEDURE — 99214 OFFICE O/P EST MOD 30 MIN: CPT

## 2024-05-23 PROCEDURE — 1123F ACP DISCUSS/DSCN MKR DOCD: CPT

## 2024-05-23 NOTE — PROGRESS NOTES
[address] [place] [address] [address]  [facility] [address]  Dept: [phone]  Dept Fax: [phone]    NEUROLOGY FOLLOW UP NOTE                                              PATIENT NAME: Zuleyka Pennington   PATIENT MRN: [id number]  FOLLOW UP TODAY: 5/23/2024        INITIAL & INTERVAL HISTORY:     Zuleyka Pennington, an 88-year-old female, presented for follow-up. Her family reported her cognitive impairment had been stable, and she continued to manage her ADLs at home. Her vitamin B12 was <150. MRI brain showed moderate chronic microvascular changes. She lived with three of her children and their spouses. Her cognitive symptoms, noticed by the family two years ago, included difficulty learning new information, remembering events, and word-finding. She denied intermittent confusion but reported personality and mood changes, with occasional nighttime hallucinations. She managed ADLs but struggled with complex tasks like balancing a checkbook. She was not driving and had no accidents. She had a history of tremors, falls, and loss of interest in hobbies. Family history of dementia included both grandparents and a younger sister.    Dementia getting worse they mention this visit, behavorial changes including bursts of anger that are unusual for her. Also possible behavioral arrest but she mentions she just \"keeps her mouth shut\".  Mini-Mental state exam done scored 23 out of 30    PMH    has a past medical history of Asthma, Diabetes mellitus (HCC), and Hypertension.     PSH/SH/FMH: Remain unchanged since last visit 1/18/2024.    ALLERGIES:   No Known Allergies    MEDICATIONS:   Current Outpatient Medications   Medication Sig Dispense Refill    lisinopril (PRINIVIL;ZESTRIL) 5 MG tablet Take 1 tablet by mouth daily      budesonide-formoterol (SYMBICORT) 160-4.5 MCG/ACT AERO Inhale 2 puffs into the lungs 2 times daily      acetaminophen (TYLENOL) 500

## 2025-01-13 ENCOUNTER — OFFICE VISIT (OUTPATIENT)
Dept: NEUROLOGY | Age: 89
End: 2025-01-13
Payer: MEDICARE

## 2025-01-13 VITALS
HEIGHT: 60 IN | OXYGEN SATURATION: 98 % | HEART RATE: 71 BPM | SYSTOLIC BLOOD PRESSURE: 157 MMHG | BODY MASS INDEX: 24.03 KG/M2 | WEIGHT: 122.4 LBS | DIASTOLIC BLOOD PRESSURE: 79 MMHG

## 2025-01-13 DIAGNOSIS — E53.8 LOW SERUM VITAMIN B12: Primary | ICD-10-CM

## 2025-01-13 PROCEDURE — 1159F MED LIST DOCD IN RCRD: CPT

## 2025-01-13 PROCEDURE — 99214 OFFICE O/P EST MOD 30 MIN: CPT

## 2025-01-13 PROCEDURE — 1123F ACP DISCUSS/DSCN MKR DOCD: CPT

## 2025-01-13 RX ORDER — LANOLIN ALCOHOL/MO/W.PET/CERES
1000 CREAM (GRAM) TOPICAL DAILY
Qty: 30 TABLET | Refills: 3 | Status: SHIPPED | OUTPATIENT
Start: 2025-01-13

## 2025-01-13 NOTE — PROGRESS NOTES
[address] [facility] [address]  [facility] [address]  Dept: [phone]  Dept Fax: [phone]    NEUROLOGY FOLLOW UP NOTE                                              PATIENT NAME: Zuleyka Pennington   PATIENT MRN: [id number]  FOLLOW UP TODAY: 1/13/2025        INITIAL & INTERVAL HISTORY:       On today's visit- MMSE 25/30 some short term memory issues but otherwise she is sharp and does not appear to have any specific pathologic finding or dementia, more so age related forgetfulness.     Zuleyka Pennington, an 89-year-old female, presented for follow-up. Her family reported her cognitive impairment had been stable, and she continued to manage her ADLs at home. Her vitamin B12 was <150. MRI brain showed moderate chronic microvascular changes. She lived with three of her children and their spouses. Her cognitive symptoms, noticed by the family two years ago, included difficulty learning new information, remembering events, and word-finding. She denied intermittent confusion but reported personality and mood changes, with occasional nighttime hallucinations. She managed ADLs but struggled with complex tasks like balancing a checkbook. She was not driving and had no accidents. She had a history of tremors, falls, and loss of interest in hobbies. Family history of dementia included both grandparents and a younger sister.    Dementia getting worse they mention this visit, behavorial changes including bursts of anger that are unusual for her. Also possible behavioral arrest but she mentions she just \"keeps her mouth shut\".  Mini-Mental state exam done scored 23 out of 30    PMH    has a past medical history of Asthma, Diabetes mellitus (HCC), and Hypertension.     PSH/SH/FMH: Remain unchanged since last visit 1/18/2024.    ALLERGIES:   No Known Allergies    MEDICATIONS:   Current Outpatient Medications   Medication Sig Dispense Refill

## 2025-06-02 DIAGNOSIS — E53.8 LOW SERUM VITAMIN B12: ICD-10-CM

## 2025-06-04 RX ORDER — LANOLIN ALCOHOL/MO/W.PET/CERES
1000 CREAM (GRAM) TOPICAL DAILY
Qty: 30 TABLET | Refills: 3 | Status: SHIPPED | OUTPATIENT
Start: 2025-06-04